# Patient Record
Sex: MALE | Race: WHITE | Employment: FULL TIME | URBAN - METROPOLITAN AREA
[De-identification: names, ages, dates, MRNs, and addresses within clinical notes are randomized per-mention and may not be internally consistent; named-entity substitution may affect disease eponyms.]

---

## 2019-04-23 ENCOUNTER — PRE VISIT (OUTPATIENT)
Dept: DERMATOLOGY | Facility: CLINIC | Age: 59
End: 2019-04-23

## 2019-04-23 NOTE — TELEPHONE ENCOUNTER
**LOOK AT HEALTH MAINTENANCE**        Dermatology Pre-visit Call:    Reason for visit: Skin Check     Any personal history of skin cancers: No    Any family history of skin cancers: No    Was the patient referred: No    If the patient was referred, are records obtained: NA.     Has the patient seen a dermatologist in the past: No. Records obtained: NA    Patient Reminders Given:  --Please, make sure you bring an updated list of your medications, allergies and insurance information.  --Plan on being in our facility for approximately one hour, this includes the registration process, office visit, education and check-out process.  If you are having a procedure, more time may be required.     --We are located on the second floor of the building, check in desk D.   --If you need to cancel or reschedule, call [phone].  --We look forward to seeing you in Dermatology Clinic.     Tameka Neil, CMA

## 2019-04-30 ENCOUNTER — OFFICE VISIT (OUTPATIENT)
Dept: DERMATOLOGY | Facility: CLINIC | Age: 59
End: 2019-04-30
Payer: COMMERCIAL

## 2019-04-30 DIAGNOSIS — D22.9 MULTIPLE BENIGN NEVI: ICD-10-CM

## 2019-04-30 DIAGNOSIS — L81.4 SOLAR LENTIGO: ICD-10-CM

## 2019-04-30 DIAGNOSIS — D48.9 NEOPLASM OF UNCERTAIN BEHAVIOR: Primary | ICD-10-CM

## 2019-04-30 DIAGNOSIS — L82.1 SEBORRHEIC KERATOSIS: ICD-10-CM

## 2019-04-30 DIAGNOSIS — L57.8 SUN-DAMAGED SKIN: ICD-10-CM

## 2019-04-30 DIAGNOSIS — L91.8 SKIN TAG: ICD-10-CM

## 2019-04-30 PROBLEM — Z86.69: Status: ACTIVE | Noted: 2019-04-24

## 2019-04-30 PROCEDURE — 11102 TANGNTL BX SKIN SINGLE LES: CPT | Performed by: DERMATOLOGY

## 2019-04-30 PROCEDURE — 99203 OFFICE O/P NEW LOW 30 MIN: CPT | Mod: 25 | Performed by: DERMATOLOGY

## 2019-04-30 PROCEDURE — 88305 TISSUE EXAM BY PATHOLOGIST: CPT | Mod: TC | Performed by: DERMATOLOGY

## 2019-04-30 PROCEDURE — 11103 TANGNTL BX SKIN EA SEP/ADDL: CPT | Performed by: DERMATOLOGY

## 2019-04-30 RX ORDER — FLUTICASONE PROPIONATE 50 MCG
1 SPRAY, SUSPENSION (ML) NASAL DAILY
COMMUNITY

## 2019-04-30 SDOH — HEALTH STABILITY: MENTAL HEALTH: HOW OFTEN DO YOU HAVE A DRINK CONTAINING ALCOHOL?: NEVER

## 2019-04-30 NOTE — PATIENT INSTRUCTIONS
Preventive Care:    Colorectal Cancer Screening: During our visit today, we discussed that it is recommended you receive colorectal cancer screening. Please call or make an appointment with your primary care provider to discuss this. You may also call the  [facility] scheduling line ([phone]) to set up a colonoscopy appointment.      Wound Care After a Biopsy    What is a skin biopsy?  A skin biopsy allows the doctor to examine a very small piece of tissue under the microscope to determine the diagnosis and the best treatment for the skin condition. A local anesthetic (numbing medicine)  is injected with a very small needle into the skin area to be tested. A small piece of skin is taken from the area. Sometimes a suture (stitch) is used.     What are the risks of a skin biopsy?  I will experience scar, bleeding, swelling, pain, crusting and redness. I may experience incomplete removal or recurrence. Risks of this procedure are excessive bleeding, bruising, infection, nerve damage, numbness, thick (hypertrophic or keloidal) scar and non-diagnostic biopsy.    How should I care for my wound for the first 24 hours?    Keep the wound dry and covered for 24 hours    If it bleeds, hold direct pressure on the area for 15 minutes. If bleeding does not stop then go to the emergency room    Avoid strenuous exercise the first 1-2 days or as your doctor instructs you    How should I care for the wound after 24 hours?    After 24 hours, remove the bandage    You may bathe or shower as normal    If you had a scalp biopsy, you can shampoo as usual and can use shower water to clean the biopsy site daily    Clean the wound twice a day with gentle soap and water    Do not scrub, be gentle    Apply white petroleum/Vaseline after cleaning the wound with a cotton swab or a clean finger, and keep the site covered with a Bandaid /bandage. Bandages are not necessary with a scalp biopsy    If you are unable to cover the site with a  Bandaid /bandage, re-apply ointment 2-3 times a day to keep the site moist. Moisture will help with healing    Avoid strenuous activity for first 1-2 days    Avoid lakes, rivers, pools, and oceans until the stitches are removed or the site is healed    How do I clean my wound?    Wash hands thoroughly with soap or use hand  before all wound care    Clean the wound with gentle soap and water    Apply white petroleum/Vaseline  to wound after it is clean    Replace the Bandaid /bandage to keep the wound covered for the first few days or as instructed by your doctor    If you had a scalp biopsy, warm shower water to the area on a daily basis should suffice    What should I use to clean my wound?     Cotton-tipped applicators (Qtips )    White petroleum jelly (Vaseline ). Use a clean new container and use Q-tips to apply.    Bandaids   as needed    Gentle soap     How should I care for my wound long term?    Do not get your wound dirty    Keep up with wound care for one week or until the area is healed.    A small scab will form and fall off by itself when the area is completely healed. The area will be red and will become pink in color as it heals. Sun protection is very important for how your scar will turn out. Sunscreen with an SPF 30 or greater is recommended once the area is healed.    You should have some soreness but it should be mild and slowly go away over several days. Talk to your doctor about using tylenol for pain,    When should I call my doctor?  If you have increased:     Pain or swelling    Pus or drainage (clear or slightly yellow drainage is ok)    Temperature over 100F    Spreading redness or warmth around wound    When will I hear about my results?  The biopsy results can take 2-3 weeks to come back. The clinic will call you with the results, send you a Jybe message, or have you schedule a follow-up clinic or phone time to discuss the results. Contact our clinics if you do not hear from  us in 3 weeks.     Who should I call with questions?    [facility]: [phone]     [facility]: [phone]    For urgent needs outside of business hours call the [facility] at [phone] and ask for the dermatology resident on call

## 2019-04-30 NOTE — LETTER
4/30/2019         RE: Audie Harris  [id number] [facility] [address]        Dear Colleague,    Thank you for referring your patient, Audie Harris, to the [facility]. Please see a copy of my visit note below.    [facility] Dermatology Note      Dermatology Problem List:  1. A) NUB, right lateral neck, s/p biopsy 4/20/2019  2. B) NUB, left upper back lateral. 1.2 cm pink scaly plaque s/p biopsy 4/20/2019  3. C) NUB, left upper back medial. s/p biopsy 4/20/2019  4. D) NUB, left lower back. s/p biopsy 4/20/2019  5. Likely BCCs, not yet biopsies: On the central back, 3 pearly pink papules  - will biopsy at next visit    Encounter Date: Apr 30, 2019    CC:  Chief Complaint   Patient presents with     Skin Check     lesions on neck and back         History of Present Illness:  Mr. Audie Harris is a 59 year old male who presents in self referral for a skin check. Today, he has lesions of concern on his neck and back. The spots on his back have been there a while. Areas are not tender, but never seem to heal. They are getting larger over time slowly. He has never been to a dermatologist. He had a mole removed on his forehead about 30 years ago.  No other concerns addressed today.      Past Medical History:   Patient Active Problem List   Diagnosis     Benign prostatic hyperplasia with urinary obstruction     Central retinal vein occlusion of right eye     Chronic back pain     Erectile dysfunction     H/O visual field defect     Hyperlipidemia     Franco's syndrome (H)     Past Medical History:   Diagnosis Date     Benign prostate hyperplasia      Central retinal vein occlusion      Deviated septum     Septoplasty     Hyperlipidemia      Franco's disease (H)      Past Surgical History:   Procedure Laterality Date     C RADIAL KERATOTOMY       COLONOSCOPY  08/24/2009     DECOMPRESSION, FUSION LUMBAR POSTERIOR TWO LEVELS, COMBINED       HC REMOVAL  NAIL/NAIL BED, W/ AMPUTATION TUFT OF PHALANX       HC REPAIR SLIDING INGUINAL HERNIA Bilateral      LAMINECTOMY, FUSION LUMBAR ONE LEVEL, COMBINED       PROSTATE SURGERY       SEPTOPLASTY       TURP         Social History:  Patient reports that he has never smoked. He has never used smokeless tobacco. He reports that he does not drink alcohol. Patient .     Family History:  No family history of skin cancer.     Medications:  Current Outpatient Medications   Medication Sig Dispense Refill     fluticasone (FLONASE) 50 MCG/ACT nasal spray Spray 1 spray into both nostrils daily         No Known Allergies    Review of Systems:  -Constitutional: Patient is otherwise feeling well, in usual state of health.   -Skin: As above in HPI. No additional skin concerns.    Physical exam:  Vitals: There were no vitals taken for this visit.  GEN: This is a well developed, well-nourished male in no acute distress, in a pleasant mood.    SKIN: Total skin excluding the undergarment areas was performed. The exam included the head/face, neck, both arms, chest, back, abdomen, both legs, digits and/or nails and buttocks.   - Escamilla Type II  - Scattered brown macules on sun exposed areas, mostly on shoulders and upper back.  - right lateral neck, hyperkeratotic 1.4 cm pink scaly plaque   - 2 similar pink scaly plaques on the left upper back, lateral one 1.2 cm in size the medial one is 6 mm in size  - 3 pink pearly papules on the central upper back   - skin tag left axilla   - 1.4 cm pink scaly and ulcerated plaque on the left lower back  - Multiple regular brown pigmented macules and papules are identified on the trunk.   - There are waxy stuck on tan to brown papules on the trunk.  - No other lesions of concern on areas examined.                         Impression/Plan:  1. Sun damaged skin with solar lentigines    Recommend sunscreens SPF #30 or greater, protective clothing and avoidance of tanning beds.    2. Benign  findings including: seborrheic keratoses, skin tags    No further intervention required. Patient to report changes.     Patient reassured of the benign nature of these lesions.    3. Multiple clinically benign nevi    No further intervention required. Patient to report changes.     Patient reassured of the benign nature of these lesions.    4. A) NUB, right lateral neck. hyperkeratotic 1.4 cm pink scaly plaque. Ddx: superficial BCC vs AK vs SCC     Shave biopsy:  After discussion of benefits and risks including but not limited to bleeding/bruising, pain/swelling, infection, scar, incomplete removal, nerve damage/numbness, recurrence, and non-diagnostic biopsy, written consent, verbal consent and photographs were obtained. Time-out was performed. The area was cleaned with isopropyl alcohol. 0.5ml of 1% lidocaine with 1:100,000 epinephrine was injected to obtain adequate anesthesia. A shave biopsy was performed. Hemostasis was achieved with aluminium chloride. Vaseline and a sterile dressing were applied. The patient tolerated the procedure and no complications were noted. The patient was provided with verbal and written post care instructions.    5. B) NUB, left upper back lateral. 1.2 cm pink scaly plaque. Ddx: superficial BCC vs AK vs SCC    Shave biopsy:  After discussion of benefits and risks including but not limited to bleeding/bruising, pain/swelling, infection, scar, incomplete removal, nerve damage/numbness, recurrence, and non-diagnostic biopsy, written consent, verbal consent and photographs were obtained. Time-out was performed. The area was cleaned with isopropyl alcohol. 0.5ml of 1% lidocaine with 1:100,000 epinephrine was injected to obtain adequate anesthesia. A shave biopsy was performed. Hemostasis was achieved with aluminium chloride. Vaseline and a sterile dressing were applied. The patient tolerated the procedure and no complications were noted. The patient was provided with verbal and written  post care instructions.    6. C) NUB, left upper back medial. 6 mm pink scaly plaque. Ddx: superficial BCC vs AK vs SCC    Shave biopsy:  After discussion of benefits and risks including but not limited to bleeding/bruising, pain/swelling, infection, scar, incomplete removal, nerve damage/numbness, recurrence, and non-diagnostic biopsy, written consent, verbal consent and photographs were obtained. Time-out was performed. The area was cleaned with isopropyl alcohol. 0.5ml of 1% lidocaine with 1:100,000 epinephrine was injected to obtain adequate anesthesia. A shave biopsy was performed. Hemostasis was achieved with aluminium chloride. Vaseline and a sterile dressing were applied. The patient tolerated the procedure and no complications were noted. The patient was provided with verbal and written post care instructions.    7. D) NUB, left lower back. 1.4 cm pink scaly and ulcerated plaque. Ddx: superficial BCC vs AK vs SCC    Shave biopsy:  After discussion of benefits and risks including but not limited to bleeding/bruising, pain/swelling, infection, scar, incomplete removal, nerve damage/numbness, recurrence, and non-diagnostic biopsy, written consent, verbal consent and photographs were obtained. Time-out was performed. The area was cleaned with isopropyl alcohol. 0.5ml of 1% lidocaine with 1:100,000 epinephrine was injected to obtain adequate anesthesia. A shave biopsy was performed. Hemostasis was achieved with aluminium chloride. Vaseline and a sterile dressing were applied. The patient tolerated the procedure and no complications were noted. The patient was provided with verbal and written post care instructions.    8. 3 pearly pink papules on the central back, likely BCCs. Patient will return for follow up biopsies after treatment of other suspected skin cancers as above. See photograph for circled lesions above.       Follow-up for further biopsy 3 months.       Staff Involved:  Scribe/Staff    Scribe  Disclosure  I, Elvia Peguero, am serving as a scribe to document services personally performed by Dr. Zayra Santos MD, based on data collection and the provider's statements to me.     Provider Disclosure:   The documentation recorded by the scribe accurately reflects the services I personally performed and the decisions made by me.    Zayra Santos MD    Department of Dermatology  [facility]: Phone: [phone], Fax:[phone]  [facility]: Phone: [phone], Fax: [phone]              Again, thank you for allowing me to participate in the care of your patient.        Sincerely,        Zayra Santos MD

## 2019-04-30 NOTE — NURSING NOTE
Audie Harris's goals for this visit include:   Chief Complaint   Patient presents with     Skin Check     lesions on neck and back       He requests these members of his care team be copied on today's visit information: NO    PCP: No Ref-Primary, Physician    Referring Provider:  No referring provider defined for this encounter.    There were no vitals taken for this visit.    Do you need any medication refills at today's visit? NO    Tameka Neil [facility]

## 2019-04-30 NOTE — PROGRESS NOTES
[facility] Dermatology Note      Dermatology Problem List:  1. A) NUB, right lateral neck, s/p biopsy 4/20/2019  2. B) NUB, left upper back lateral. 1.2 cm pink scaly plaque s/p biopsy 4/20/2019  3. C) NUB, left upper back medial. s/p biopsy 4/20/2019  4. D) NUB, left lower back. s/p biopsy 4/20/2019  5. Likely BCCs, not yet biopsies: On the central back, 3 pearly pink papules  - will biopsy at next visit    Encounter Date: Apr 30, 2019    CC:  Chief Complaint   Patient presents with     Skin Check     lesions on neck and back         History of Present Illness:  Mr. Audie Harris is a 59 year old male who presents in self referral for a skin check. Today, he has lesions of concern on his neck and back. The spots on his back have been there a while. Areas are not tender, but never seem to heal. They are getting larger over time slowly. He has never been to a dermatologist. He had a mole removed on his forehead about 30 years ago.  No other concerns addressed today.      Past Medical History:   Patient Active Problem List   Diagnosis     Benign prostatic hyperplasia with urinary obstruction     Central retinal vein occlusion of right eye     Chronic back pain     Erectile dysfunction     H/O visual field defect     Hyperlipidemia     Franco's syndrome (H)     Past Medical History:   Diagnosis Date     Benign prostate hyperplasia      Central retinal vein occlusion      Deviated septum     Septoplasty     Hyperlipidemia      Franco's disease (H)      Past Surgical History:   Procedure Laterality Date     C RADIAL KERATOTOMY       COLONOSCOPY  08/24/2009     DECOMPRESSION, FUSION LUMBAR POSTERIOR TWO LEVELS, COMBINED       HC REMOVAL NAIL/NAIL BED, W/ AMPUTATION TUFT OF PHALANX       HC REPAIR SLIDING INGUINAL HERNIA Bilateral      LAMINECTOMY, FUSION LUMBAR ONE LEVEL, COMBINED       PROSTATE SURGERY       SEPTOPLASTY       TURP         Social History:  Patient reports that he has never  smoked. He has never used smokeless tobacco. He reports that he does not drink alcohol. Patient .     Family History:  No family history of skin cancer.     Medications:  Current Outpatient Medications   Medication Sig Dispense Refill     fluticasone (FLONASE) 50 MCG/ACT nasal spray Spray 1 spray into both nostrils daily         No Known Allergies    Review of Systems:  -Constitutional: Patient is otherwise feeling well, in usual state of health.   -Skin: As above in HPI. No additional skin concerns.    Physical exam:  Vitals: There were no vitals taken for this visit.  GEN: This is a well developed, well-nourished male in no acute distress, in a pleasant mood.    SKIN: Total skin excluding the undergarment areas was performed. The exam included the head/face, neck, both arms, chest, back, abdomen, both legs, digits and/or nails and buttocks.   - Escamilla Type II  - Scattered brown macules on sun exposed areas, mostly on shoulders and upper back.  - right lateral neck, hyperkeratotic 1.4 cm pink scaly plaque   - 2 similar pink scaly plaques on the left upper back, lateral one 1.2 cm in size the medial one is 6 mm in size  - 3 pink pearly papules on the central upper back   - skin tag left axilla   - 1.4 cm pink scaly and ulcerated plaque on the left lower back  - Multiple regular brown pigmented macules and papules are identified on the trunk.   - There are waxy stuck on tan to brown papules on the trunk.  - No other lesions of concern on areas examined.                         Impression/Plan:  1. Sun damaged skin with solar lentigines    Recommend sunscreens SPF #30 or greater, protective clothing and avoidance of tanning beds.    2. Benign findings including: seborrheic keratoses, skin tags    No further intervention required. Patient to report changes.     Patient reassured of the benign nature of these lesions.    3. Multiple clinically benign nevi    No further intervention required.  Patient to report changes.     Patient reassured of the benign nature of these lesions.    4. A) NUB, right lateral neck. hyperkeratotic 1.4 cm pink scaly plaque. Ddx: superficial BCC vs AK vs SCC     Shave biopsy:  After discussion of benefits and risks including but not limited to bleeding/bruising, pain/swelling, infection, scar, incomplete removal, nerve damage/numbness, recurrence, and non-diagnostic biopsy, written consent, verbal consent and photographs were obtained. Time-out was performed. The area was cleaned with isopropyl alcohol. 0.5ml of 1% lidocaine with 1:100,000 epinephrine was injected to obtain adequate anesthesia. A shave biopsy was performed. Hemostasis was achieved with aluminium chloride. Vaseline and a sterile dressing were applied. The patient tolerated the procedure and no complications were noted. The patient was provided with verbal and written post care instructions.    5. B) NUB, left upper back lateral. 1.2 cm pink scaly plaque. Ddx: superficial BCC vs AK vs SCC    Shave biopsy:  After discussion of benefits and risks including but not limited to bleeding/bruising, pain/swelling, infection, scar, incomplete removal, nerve damage/numbness, recurrence, and non-diagnostic biopsy, written consent, verbal consent and photographs were obtained. Time-out was performed. The area was cleaned with isopropyl alcohol. 0.5ml of 1% lidocaine with 1:100,000 epinephrine was injected to obtain adequate anesthesia. A shave biopsy was performed. Hemostasis was achieved with aluminium chloride. Vaseline and a sterile dressing were applied. The patient tolerated the procedure and no complications were noted. The patient was provided with verbal and written post care instructions.    6. C) NUB, left upper back medial. 6 mm pink scaly plaque. Ddx: superficial BCC vs AK vs SCC    Shave biopsy:  After discussion of benefits and risks including but not limited to bleeding/bruising, pain/swelling, infection,  scar, incomplete removal, nerve damage/numbness, recurrence, and non-diagnostic biopsy, written consent, verbal consent and photographs were obtained. Time-out was performed. The area was cleaned with isopropyl alcohol. 0.5ml of 1% lidocaine with 1:100,000 epinephrine was injected to obtain adequate anesthesia. A shave biopsy was performed. Hemostasis was achieved with aluminium chloride. Vaseline and a sterile dressing were applied. The patient tolerated the procedure and no complications were noted. The patient was provided with verbal and written post care instructions.    7. D) NUB, left lower back. 1.4 cm pink scaly and ulcerated plaque. Ddx: superficial BCC vs AK vs SCC    Shave biopsy:  After discussion of benefits and risks including but not limited to bleeding/bruising, pain/swelling, infection, scar, incomplete removal, nerve damage/numbness, recurrence, and non-diagnostic biopsy, written consent, verbal consent and photographs were obtained. Time-out was performed. The area was cleaned with isopropyl alcohol. 0.5ml of 1% lidocaine with 1:100,000 epinephrine was injected to obtain adequate anesthesia. A shave biopsy was performed. Hemostasis was achieved with aluminium chloride. Vaseline and a sterile dressing were applied. The patient tolerated the procedure and no complications were noted. The patient was provided with verbal and written post care instructions.    8. 3 pearly pink papules on the central back, likely BCCs. Patient will return for follow up biopsies after treatment of other suspected skin cancers as above. See photograph for circled lesions above.       Follow-up for further biopsy 3 months.       Staff Involved:  Scribe/Staff    Scribe Disclosure  I, Elvia Peguero, am serving as a scribe to document services personally performed by Dr. Zayra Santos MD, based on data collection and the provider's statements to me.     Provider Disclosure:   The documentation recorded by the scribe  accurately reflects the services I personally performed and the decisions made by me.    Zayra Santos MD    Department of Dermatology  [facility]: Phone: [phone], Fax:[phone]  [facility]: Phone: [phone], Fax: [phone]

## 2019-05-03 LAB — COPATH REPORT: NORMAL

## 2019-05-14 ENCOUNTER — TELEPHONE (OUTPATIENT)
Dept: DERMATOLOGY | Facility: CLINIC | Age: 59
End: 2019-05-14

## 2019-05-14 NOTE — TELEPHONE ENCOUNTER
[facility] Call Center    Phone Message    May a detailed message be left on voicemail: yes    Reason for Call: Requesting Results   Name/type of test: Dr. Santos  Date of test: 04/30/19  Was test done at a location other than [facility] (Please fill in the location if not [facility])?: No  Prefers to be called at 3pm if possible not able to answer calls before that time. Ok to LVM    Action Taken: Message routed to:  Adult Clinics: Dermatology p [id number]

## 2019-05-14 NOTE — TELEPHONE ENCOUNTER
I left a message for patient to call [facility].  Patient notified on voicemail of results and that we are awaiting Dr. Carr's recommendation for treatment options.  Call back # provided.  Sarah Daniels RN

## 2019-05-15 NOTE — TELEPHONE ENCOUNTER
I left a message for patient to call [facility].  See result note.  Sarah Daniels RN

## 2019-05-22 ENCOUNTER — TELEPHONE (OUTPATIENT)
Dept: DERMATOLOGY | Facility: CLINIC | Age: 59
End: 2019-05-22

## 2019-05-22 NOTE — TELEPHONE ENCOUNTER
Excision previsit information                                                    Audie Harris is a 59 year old male     Patient is being referred to Dr. Carr  Referring Provider: Dr. Santos  For treatment of: basal cell carcinoma  Site(s): Right lateral neck, left upper back lateral, left upper back medial, left lower back.   -if site is the groin or below knee, send to RN for initiation of antibiotic prophylaxis protocol.  Previous Records received:  YES    Medication & Allergy Information                                                    CURRENT MEDICATIONS:   Current Outpatient Medications   Medication Sig Dispense Refill     fluticasone (FLONASE) 50 MCG/ACT nasal spray Spray 1 spray into both nostrils daily         Do you take the following medications:  Aspirin:  NO  Ibuprofen/Advil/Motrin, Aleve/Naproxen:  No  Coumadin, Eliquis, Pradaxa, Xarelto:   NO   -If on Coumadin, INR should be checked within 7 days of surgery.  Range should be 3.5 or less or within therapeutic range.  Vitamin E:   NO  Plavix:   NO    [place]'s wart: NO   Garlic supplement:  NO   Fish Oil: NO  Antibiotic Prophylaxis:  NO    Do you have an ALLERGIC REACTION to any medications:  NO  Patient has no known allergies.    Past Medical History                                                    Do you currently or have you previously had any of the following conditions:       HIV/AIDS:  NO    Hepatitis:  NO    Suppressed Immune System:  NO    Autoimmune disorder (eg RA, Lupus):  NO    Prolonged bleeding or bleeding disorder:  NO    Fever blisters/herpes, cold sores:  NO    Kidney disease:  NO  No results found for: CR]    Pacemaker or Defibrillator:  NO.      History of artificial or heart valve replacement:  NO.      Endocarditis: NO    Organ transplant: NO.    Joint replacement within past 2 years: NO    Previous prosthetic joint infection: NO    Diabetes: NO    Pregnant:: NO    Keloids or Abnormal scars: NO    Mobility device  (wheelchair, transfer difficulty): NO    Tobacco use:  NO.    History   Smoking Status     Never Smoker   Smokeless Tobacco     Never Used       If any positives, send to RN to initiate antibiotic prophylaxis protocol and/or anticoagulation management protocol    Reviewed by:  Sonal Alejandre

## 2019-05-30 ENCOUNTER — OFFICE VISIT (OUTPATIENT)
Dept: DERMATOLOGY | Facility: CLINIC | Age: 59
End: 2019-05-30
Payer: COMMERCIAL

## 2019-05-30 VITALS — SYSTOLIC BLOOD PRESSURE: 136 MMHG | HEART RATE: 73 BPM | DIASTOLIC BLOOD PRESSURE: 99 MMHG

## 2019-05-30 DIAGNOSIS — C44.519 BASAL CELL CARCINOMA (BCC) OF UPPER BACK: ICD-10-CM

## 2019-05-30 DIAGNOSIS — C44.41 BASAL CELL CARCINOMA (BCC) OF RIGHT SIDE OF NECK: Primary | ICD-10-CM

## 2019-05-30 DIAGNOSIS — C44.519 BASAL CELL CARCINOMA OF LOWER BACK: ICD-10-CM

## 2019-05-30 PROCEDURE — 11623 EXC S/N/H/F/G MAL+MRG 2.1-3: CPT | Mod: 51 | Performed by: DERMATOLOGY

## 2019-05-30 PROCEDURE — 12042 INTMD RPR N-HF/GENIT2.6-7.5: CPT | Mod: 51 | Performed by: DERMATOLOGY

## 2019-05-30 PROCEDURE — 12035 INTMD RPR S/A/T/EXT 12.6-20: CPT | Performed by: DERMATOLOGY

## 2019-05-30 PROCEDURE — 11602 EXC TR-EXT MAL+MARG 1.1-2 CM: CPT | Mod: 51 | Performed by: DERMATOLOGY

## 2019-05-30 PROCEDURE — 11603 EXC TR-EXT MAL+MARG 2.1-3 CM: CPT | Mod: 51 | Performed by: DERMATOLOGY

## 2019-05-30 PROCEDURE — 88305 TISSUE EXAM BY PATHOLOGIST: CPT | Mod: TC | Performed by: DERMATOLOGY

## 2019-05-30 RX ORDER — MUPIROCIN 20 MG/G
OINTMENT TOPICAL
Qty: 22 G | Refills: 0 | Status: SHIPPED | OUTPATIENT
Start: 2019-05-30 | End: 2020-06-25

## 2019-05-30 ASSESSMENT — PAIN SCALES - GENERAL: PAINLEVEL: NO PAIN (0)

## 2019-05-30 NOTE — PATIENT INSTRUCTIONS

## 2019-05-30 NOTE — NURSING NOTE
The following medication was given:     MEDICATION:  Lidocaine with epinephrine 1% [id number]  ROUTE: SQ  SITE: see procedure note  DOSE: 31cc  LOT #: [id number]  : Hospira  EXPIRATION DATE: 1-feb-2020  NDC#: 6338-1410-36   Was there drug waste? 2cc  Multi-dose vial: Yes    Purvi Montaño LPN  May 30, 2019    Prior to injection, verified patient identity using patient's name and date of birth.      Lido with epi 1%    Drug Amount Wasted:  Yes: 2 mg/ml   Vial/Syringe: Multi dose vial  Expiration Date:  1-feb-2020    Purvi Montaño LPN

## 2019-05-30 NOTE — PROGRESS NOTES
DERMATOLOGY EXCISION PROCEDURE NOTE    [facility]   [address], [place] [address]     NAME OF PROCEDURE: Excision intermediate layered linear closure  Staff surgeon: John Carr DO  Scrub Nurse: Purvi Montaño LPN     PRE-OPERATIVE DIAGNOSIS:  Basal cell carcinoma  POST-OPERATIVE DIAGNOSIS: same   FINAL EXCISION SIZE(DEFECT SIZE): 2.8 x 2.1 cm, with 4 mm margin   FINAL REPAIR LENGTH: 6.7 cm     INDICATIONS: This patient presented with a 2.0 x 1.3 cm basal cell carcinoma of the left lower back. Excision was indicated. We discussed the principles of treatment and most likely complications including scarring, bleeding, infection, incomplete excision, wound dehiscence, pain, nerve damage, and recurrence. Informed consent was obtained and the patient underwent the procedure as follows:    PROCEDURE: The patient was taken to the operative suite. Time-out was performed.  The treatment area was anesthetized with 1% lidocaine and epinephrine (1:100,000). The area was prepped with Chlorhexidine and rinsed with sterile saline and draped with sterile towels. The lesion was delineated and excised down to subcutaneous fat in a elliptical manner. Hemostasis was obtained by electrocoagulation.     REPAIR: An intermediate layered linear closure was selected as the procedure which would maximally preserve both function and cosmesis.    After the excision of the tumor, the area was carefully undermined. Hemostasis was obtained with electrocoagulation.  Closure was oriented so that the wound was in the patient's natural skin tension lines. The subcutaneous and dermal layers were then closed with 3-0, 4-0 Vicryl sutures. The epidermis was then carefully approximated along the length of the wound using 4-0 Prolene simple running sutures.     The final wound length was 6.7 cm. A total of 5.0 ml of anesthesia was administered for all surgical sites. Estimated blood loss was less than  10 ml for all surgical sites. A sterile pressure dressing was applied and wound care instructions, with a written handout, were given. The [facility] and ambulatory.    Follow-up in 10-14 days for suture removal, otherwise in 2 months for skin exam.       Anatomic Pathology Results: pending      -------------------------------------------------------------------------------------------------------------      DERMATOLOGY EXCISION PROCEDURE NOTE    [facility]   [address], [place] [address]     NAME OF PROCEDURE: Excision intermediate layered linear closure  Staff surgeon: John Carr DO  Scrub Nurse: Purvi Montaño LPN     PRE-OPERATIVE DIAGNOSIS:  Basal cell carcinoma   POST-OPERATIVE DIAGNOSIS: same   FINAL EXCISION SIZE(DEFECT SIZE): 2.3 x 1.8 cm and 1.4 x 1.6 cm, with 4 mm margin   FINAL REPAIR LENGTH: 7.7 cm     INDICATIONS: This patient presented with a 1.5 x 1.0 cm and 0.6 x 0.8 cm basal cell carcinomas of the left upper back medial and lateral (repaired together). Excision was indicated. We discussed the principles of treatment and most likely complications including scarring, bleeding, infection, incomplete excision, wound dehiscence, pain, nerve damage, and recurrence. Informed consent was obtained and the patient underwent the procedure as follows:    PROCEDURE: The patient was taken to the operative suite. Time-out was performed.  The treatment area was anesthetized with 1% lidocaine and epinephrine (1:100,000). The area was prepped with Chlorhexidine and rinsed with sterile saline and draped with sterile towels. The lesion was delineated and excised down to subcutaneous fat in a elliptical manner. Hemostasis was obtained by electrocoagulation.     REPAIR: An intermediate layered linear closure was selected as the procedure which would maximally preserve both function and cosmesis.    After the  excision of the tumor, the area was carefully undermined. Hemostasis was obtained with electrocoagulation.  Closure was oriented so that the wound was in the patient's natural skin tension lines. The subcutaneous and dermal layers were then closed with 3-0, 4-0 Vicryl sutures. The epidermis was then carefully approximated along the length of the wound using 4-0 Prolene simple running sutures.     The final wound length was 7.7 cm. A total of 7.0 ml of anesthesia was administered for all surgical sites. Estimated blood loss was less than 10 ml for all surgical sites. A sterile pressure dressing was applied and wound care instructions, with a written handout, were given. The patient was discharged [facility] the [facility] alert and ambulatory.    Follow-up in 10-14 days for suture removal, otherwise in 2 months for skin exam.       Anatomic Pathology Results: pending      -------------------------------------------------------------------------------------------------------      DERMATOLOGY EXCISION PROCEDURE NOTE    [facility]   [address] [place] [address]     NAME OF PROCEDURE: Excision intermediate layered linear closure  Staff surgeon: John Carr DO  Scrub Nurse: Purvi Montaño LPN     PRE-OPERATIVE DIAGNOSIS:  Basal cell carcinoma  POST-OPERATIVE DIAGNOSIS: same   FINAL EXCISION SIZE(DEFECT SIZE): 2.3 x 1.6 cm, with 4 mm margin   FINAL REPAIR LENGTH: 5.3 cm     INDICATIONS: This patient presented with a 1.5 x 0.8 cm basal cell carcinoma of the right lateral neck. Excision was indicated. We discussed the principles of treatment and most likely complications including scarring, bleeding, infection, incomplete excision, wound dehiscence, pain, nerve damage, and recurrence. Informed consent was obtained and the patient underwent the procedure as follows:    PROCEDURE: The patient was taken to the operative suite. Time-out was performed.  The  treatment area was anesthetized with 1% lidocaine and epinephrine (1:100,000). The area was prepped with Chlorhexidine and rinsed with sterile saline and draped with sterile towels. The lesion was delineated and excised down to subcutaneous fat in a elliptical manner. Hemostasis was obtained by electrocoagulation.     REPAIR: An intermediate layered linear closure was selected as the procedure which would maximally preserve both function and cosmesis.    After the excision of the tumor, the area was carefully undermined. Hemostasis was obtained with electrocoagulation.  Closure was oriented so that the wound was in the patient's natural skin tension lines. The subcutaneous and dermal layers were then closed with 4-0 Monocryl, 4-0 Vicryl sutures. The epidermis was then carefully approximated along the length of the wound using 4-0 Prolene simple running sutures.     The final wound length was 5.3 cm. A total of 6.0 ml of anesthesia was administered for all surgical sites. Estimated blood loss was less than 10 ml for all surgical sites. A sterile pressure dressing was applied and wound care instructions, with a written handout, were given. The patient was [facility] the [facility] alert and ambulatory.    Follow-up in 10-14 days for suture removal, otherwise in 2 months for skin exam.       Anatomic Pathology Results: pending      Staff Involved:    Scribe Disclosure  I, Fredis Vieira, am serving as a scribe to document services personally performed by Dr. John Carr DO, based on data collection and the provider's statements to me.     Provider Disclosure:   The documentation recorded by the scribe accurately reflects the services I personally performed and the decisions made by me.  I personally performed the procedures today.    John Carr DO    Department of Dermatology  [facility]: Phone: [phone],  Fax:[phone]  [facility]: Phone: [phone], Fax: [phone]

## 2019-05-30 NOTE — NURSING NOTE
Excision previsit information                                                    Audie Harris is a 59 year old male      Patient is being referred to Dr. Carr  Referring Provider: Dr. Santos  For treatment of: basal cell carcinoma  Site(s): Right lateral neck, left upper back lateral, left upper back medial, left lower back.              -if site is the groin or below knee, send to RN for initiation of antibiotic prophylaxis protocol.  Previous Records received:  YES     Medication & Allergy Information                                                    CURRENT MEDICATIONS:   Current Outpatient Prescriptions          Current Outpatient Medications   Medication Sig Dispense Refill     fluticasone (FLONASE) 50 MCG/ACT nasal spray Spray 1 spray into both nostrils daily                Do you take the following medications:  Aspirin:  NO  Ibuprofen/Advil/Motrin, Aleve/Naproxen:  No  Coumadin, Eliquis, Pradaxa, Xarelto:   NO              -If on Coumadin, INR should be checked within 7 days of surgery.  Range should be 3.5 or less or within therapeutic range.  Vitamin E:   NO  Plavix:   NO       [place]'s wart: NO     Garlic supplement:  NO          Fish Oil: NO  Antibiotic Prophylaxis:  NO     Do you have an ALLERGIC REACTION to any medications:  NO  Patient has no known allergies.     Past Medical History                                                    Do you currently or have you previously had any of the following conditions:        HIV/AIDS:  NO    Hepatitis:  NO    Suppressed Immune System:  NO    Autoimmune disorder (eg RA, Lupus):  NO    Prolonged bleeding or bleeding disorder:  NO    Fever blisters/herpes, cold sores:  NO    Kidney disease:  NO  No results found for: CR]    Pacemaker or Defibrillator:  NO.      History of artificial or heart valve replacement:  NO.      Endocarditis: NO    Organ transplant: NO.    Joint replacement within past 2 years: NO    Previous prosthetic joint infection:  NO    Diabetes: NO    Pregnant:: NO    Keloids or Abnormal scars: NO    Mobility device (wheelchair, transfer difficulty): NO    Tobacco use:  NO.        History   Smoking Status     Never Smoker   Smokeless Tobacco     Never Used         If any positives, send to RN to initiate antibiotic prophylaxis protocol and/or anticoagulation management protocol     Reviewed by:  Sonal Harris's goals for this visit include:   Chief Complaint   Patient presents with     Procedure     Excision x3        He requests these members of his care team be copied on today's visit information: yes    PCP: No Ref-Primary, Physician    Referring Provider:  No referring provider defined for this encounter.    There were no vitals taken for this visit.    Do you need any medication refills at today's visit? No     Marcos Medina CMA

## 2019-05-30 NOTE — NURSING NOTE
Mupirocin, Vaseline and pressure dressing applied to Excision site on right lateral neck left lower back and left upper back medial and lateral.  Wound care instructions reviewed with patient and AVS provided.  Patient verbalized understanding.  Patient will follow up for suture removal in two weeks.  No further questions or concerns at this time.      Purvi Montaño LPN

## 2019-05-30 NOTE — LETTER
5/30/2019         RE: Audie Harris  [address] [address] [address]        Dear Colleague,    Thank you for referring your patient, Audie Harris, to the [facility]. Please see a copy of my visit note below.    DERMATOLOGY EXCISION PROCEDURE NOTE    [facility]   [address], [place] [address]     NAME OF PROCEDURE: Excision intermediate layered linear closure  Staff surgeon: John Carr DO  Scrub Nurse: Purvi Montaño LPN     PRE-OPERATIVE DIAGNOSIS:  Basal cell carcinoma  POST-OPERATIVE DIAGNOSIS: same   FINAL EXCISION SIZE(DEFECT SIZE): 2.8 x 2.1 cm, with 4 mm margin   FINAL REPAIR LENGTH: 6.7 cm     INDICATIONS: This patient presented with a 2.0 x 1.3 cm basal cell carcinoma of the left lower back. Excision was indicated. We discussed the principles of treatment and most likely complications including scarring, bleeding, infection, incomplete excision, wound dehiscence, pain, nerve damage, and recurrence. Informed consent was obtained and the patient underwent the procedure as follows:    PROCEDURE: The patient was taken to the operative suite. Time-out was performed.  The treatment area was anesthetized with 1% lidocaine and epinephrine (1:100,000). The area was prepped with Chlorhexidine and rinsed with sterile saline and draped with sterile towels. The lesion was delineated and excised down to subcutaneous fat in a elliptical manner. Hemostasis was obtained by electrocoagulation.     REPAIR: An intermediate layered linear closure was selected as the procedure which would maximally preserve both function and cosmesis.    After the excision of the tumor, the area was carefully undermined. Hemostasis was obtained with electrocoagulation.  Closure was oriented so that the wound was in the patient's natural skin tension lines. The subcutaneous and dermal layers were then closed with 3-0, 4-0 Vicryl sutures. The  epidermis was then carefully approximated along the length of the wound using 4-0 Prolene simple running sutures.     The final wound length was 6.7 cm. A total of 5.0 ml of anesthesia was administered for all surgical sites. Estimated blood loss was less than 10 ml for all surgical sites. A sterile pressure dressing was applied and wound care instructions, with a written handout, were given. The patient was [facility] alert and ambulatory.    Follow-up in 10-14 days for suture removal, otherwise in 2 months for skin exam.       Anatomic Pathology Results: pending      -------------------------------------------------------------------------------------------------------------      DERMATOLOGY EXCISION PROCEDURE NOTE    [address] [address]     NAME OF PROCEDURE: Excision intermediate layered linear closure  Staff surgeon: John Carr DO  Scrub Nurse: Purvi Montaño LPN     PRE-OPERATIVE DIAGNOSIS:  Basal cell carcinoma   POST-OPERATIVE DIAGNOSIS: same   FINAL EXCISION SIZE(DEFECT SIZE): 2.3 x 1.8 cm and 1.4 x 1.6 cm, with 4 mm margin   FINAL REPAIR LENGTH: 7.7 cm     INDICATIONS: This patient presented with a 1.5 x 1.0 cm and 0.6 x 0.8 cm basal cell carcinomas of the left upper back medial and lateral (repaired together). Excision was indicated. We discussed the principles of treatment and most likely complications including scarring, bleeding, infection, incomplete excision, wound dehiscence, pain, nerve damage, and recurrence. Informed consent was obtained and the patient underwent the procedure as follows:    PROCEDURE: The patient was taken to the operative suite. Time-out was performed.  The treatment area was anesthetized with 1% lidocaine and epinephrine (1:100,000). The area was prepped with Chlorhexidine and rinsed with sterile saline and draped with sterile towels. The lesion was delineated  and excised down to subcutaneous fat in a elliptical manner. Hemostasis was obtained by electrocoagulation.     REPAIR: An intermediate layered linear closure was selected as the procedure which would maximally preserve both function and cosmesis.    After the excision of the tumor, the area was carefully undermined. Hemostasis was obtained with electrocoagulation.  Closure was oriented so that the wound was in the patient's natural skin tension lines. The subcutaneous and dermal layers were then closed with 3-0, 4-0 Vicryl sutures. The epidermis was then carefully approximated along the length of the wound using 4-0 Prolene simple running sutures.     The final wound length was 7.7 cm. A total of 7.0 ml of anesthesia was administered for all surgical sites. Estimated blood loss was less than 10 ml for all surgical sites. A sterile pressure dressing was applied and wound care instructions, with a written handout, were given. The patient was [facility] from the [facility] alert and ambulatory.    Follow-up in 10-14 days for suture removal, otherwise in 2 months for skin exam.       Anatomic Pathology Results: pending      -------------------------------------------------------------------------------------------------------      DERMATOLOGY EXCISION PROCEDURE NOTE    [facility]   [address], [place] [address]     NAME OF PROCEDURE: Excision intermediate layered linear closure  Staff surgeon: John Carr DO  Scrub Nurse: Purvi Montaño LPN     PRE-OPERATIVE DIAGNOSIS:  Basal cell carcinoma  POST-OPERATIVE DIAGNOSIS: same   FINAL EXCISION SIZE(DEFECT SIZE): 2.3 x 1.6 cm, with 4 mm margin   FINAL REPAIR LENGTH: 5.3 cm     INDICATIONS: This patient presented with a 1.5 x 0.8 cm basal cell carcinoma of the right lateral neck. Excision was indicated. We discussed the principles of treatment and most likely complications including scarring, bleeding,  infection, incomplete excision, wound dehiscence, pain, nerve damage, and recurrence. Informed consent was obtained and the patient underwent the procedure as follows:    PROCEDURE: The patient was taken to the operative suite. Time-out was performed.  The treatment area was anesthetized with 1% lidocaine and epinephrine (1:100,000). The area was prepped with Chlorhexidine and rinsed with sterile saline and draped with sterile towels. The lesion was delineated and excised down to subcutaneous fat in a elliptical manner. Hemostasis was obtained by electrocoagulation.     REPAIR: An intermediate layered linear closure was selected as the procedure which would maximally preserve both function and cosmesis.    After the excision of the tumor, the area was carefully undermined. Hemostasis was obtained with electrocoagulation.  Closure was oriented so that the wound was in the patient's natural skin tension lines. The subcutaneous and dermal layers were then closed with 4-0 Monocryl, 4-0 Vicryl sutures. The epidermis was then carefully approximated along the length of the wound using 4-0 Prolene simple running sutures.     The final wound length was 5.3 cm. A total of 6.0 ml of anesthesia was administered for all surgical sites. Estimated blood loss was less than 10 ml for all surgical sites. A sterile pressure dressing was applied and wound care instructions, with a written handout, were given. The patient was discharged from the [facility] alert and ambulatory.    Follow-up in 10-14 days for suture removal, otherwise in 2 months for skin exam.       Anatomic Pathology Results: pending      Staff Involved:    Scribe Disclosure  I, Fredis Vieira am serving as a scribe to document services personally performed by Dr. John Carr DO, based on data collection and the provider's statements to me.     Provider Disclosure:   The documentation recorded by the scribe accurately reflects the services I  personally performed and the decisions made by me.  I personally performed the procedures today.    John Carr DO    Department of Dermatology  [facility]: Phone: [phone], Fax:[phone]  [facility]: Phone: [phone], Fax: [phone]    Again, thank you for allowing me to participate in the care of your patient.        Sincerely,        John Carr MD

## 2019-05-31 ENCOUNTER — ALLIED HEALTH/NURSE VISIT (OUTPATIENT)
Dept: NURSING | Facility: CLINIC | Age: 59
End: 2019-05-31
Payer: COMMERCIAL

## 2019-05-31 DIAGNOSIS — Z51.89 VISIT FOR WOUND CHECK: Primary | ICD-10-CM

## 2019-05-31 PROCEDURE — 99207 ZZC NO CHARGE NURSE ONLY: CPT | Performed by: DERMATOLOGY

## 2019-05-31 NOTE — NURSING NOTE
Audie is 24 hour s/p excision x 3 on back.  He presented to clinic at request of RN due to concerns of bandage irritation on neck.    He states his wife lifted a corner of the bandage on his neck and noted the skin to be pink. Wounds were bandaged with cloth tape. He states it itches, but only mildly.  Patient denies pain at all sites.    I removed dressing on the neck and the surrounding tissue was pinkish red.  The incision site was intact with no erythema, edema, or drainage.  New Telfa and dental roll placed and covered with paper tape.    Site on mid back and lower back also assessed.  No pink or irritation noted to surrounding tissue.  Overlying cloth tape was replaced with paper tape.    Patient had no further questions or concerns.    Sarah Daniels RN

## 2019-06-03 LAB — COPATH REPORT: NORMAL

## 2019-06-13 ENCOUNTER — ALLIED HEALTH/NURSE VISIT (OUTPATIENT)
Dept: NURSING | Facility: CLINIC | Age: 59
End: 2019-06-13
Payer: COMMERCIAL

## 2019-06-13 DIAGNOSIS — Z48.02 VISIT FOR SUTURE REMOVAL: Primary | ICD-10-CM

## 2019-06-13 PROCEDURE — 99207 ZZC NO CHARGE NURSE ONLY: CPT | Performed by: DERMATOLOGY

## 2019-06-13 NOTE — NURSING NOTE
Audie Harris comes into clinic today at the request of   Ordering Provider for Suture removal .    Audie Harris presents to the clinic today for  removal of sutures.  The patient has had the sutures in place for 14 days.    There has been no history of infection or drainage.    O:  sutures are seen located on the right lateral neck, upper back and lower back .  The wound is healing well with no signs of infection.        A: Suture removal.    P:  All sutures were easily removed today.  Routine wound care discussed.  The patient will follow up as needed.        This service provided today was under the supervising provider of the day , who was available if needed.    Candace Vasquez RN

## 2019-07-30 ENCOUNTER — OFFICE VISIT (OUTPATIENT)
Dept: DERMATOLOGY | Facility: CLINIC | Age: 59
End: 2019-07-30
Payer: COMMERCIAL

## 2019-07-30 DIAGNOSIS — Z85.828 HISTORY OF NONMELANOMA SKIN CANCER: ICD-10-CM

## 2019-07-30 DIAGNOSIS — D48.9 NEOPLASM OF UNCERTAIN BEHAVIOR: Primary | ICD-10-CM

## 2019-07-30 PROCEDURE — 99213 OFFICE O/P EST LOW 20 MIN: CPT | Mod: 25 | Performed by: DERMATOLOGY

## 2019-07-30 PROCEDURE — 11103 TANGNTL BX SKIN EA SEP/ADDL: CPT | Performed by: DERMATOLOGY

## 2019-07-30 PROCEDURE — 11102 TANGNTL BX SKIN SINGLE LES: CPT | Performed by: DERMATOLOGY

## 2019-07-30 PROCEDURE — 88305 TISSUE EXAM BY PATHOLOGIST: CPT | Mod: TC | Performed by: DERMATOLOGY

## 2019-07-30 NOTE — NURSING NOTE
@Audie Harris's goals for this visit include:   Chief Complaint   Patient presents with     Biopsy       He requests these members of his care team be copied on today's visit information: NO    PCP: No Ref-Primary, Physician    Referring Provider:  No referring provider defined for this encounter.    There were no vitals taken for this visit.    Do you need any medication refills at today's visit? NO    Tameka Neil [facility]

## 2019-07-30 NOTE — NURSING NOTE
The following medication was given:     MEDICATION:  Lidocaine with epinephrine 1% 1:[id number]  ROUTE: SQ  SITE: see procedure note  DOSE: 1cc  LOT #: -[id number]  : [place]  EXPIRATION DATE: 12-1-19  NDC#: 6975-7161-62   Was there drug waste? Yes  Multi-dose vial: Yes    Tameka Neil CMA  July 30, 2019

## 2019-07-30 NOTE — LETTER
7/30/2019         RE: Audie Harris  [id number] [facility] [address]        Dear Colleague,    Thank you for referring your patient, Audie Harris, to the [facility]. Please see a copy of my visit note below.    [facility] Dermatology Note      Dermatology Problem List:   1.  History of BCC  - right lateral neck, s/p biopsy 4/20/2019, s/p excision 5/30/19  - left upper back lateral, s/p biopsy 4/20/2019, s/p excision 5/30/19  - left upper back medial, s/p biopsy 4/20/2019, s/p excision 5/30/19  - left lower back, s/p biopsy 4/20/2019, s/p excision 5/30/19  2. NUB A) left upper back, s/p biopsy 7/30/19  3. NUB B) central upper back, s/p biopsy 7/30/19  4. NUB C) left mid back, s/p biopsy 7/30/19    Encounter Date: Jul 30, 2019    CC:  Chief Complaint   Patient presents with     Biopsy         History of Present Illness:  Mr. Audie Harris is a 59 year old male who presents as a follow-up for biopsies. The patient was last seen for a clinic appt on 4/30/19  when four biopsies were done. All four showed BCC. He has since had excisions on all sites. He healed very easily. He prefers surgical removal of skin cancers as it is quick and done for him. At last visit, another 4 pearly papules were found on the left upper back and plan was for him to return for biopsies after treatment of initial four spots. He has not had any symptoms at these sites. Denies any tender, nonhealing, bleeding skin lesions elsewhere on the body. No other concerns addressed today.  .     Past Medical History:   Patient Active Problem List   Diagnosis     Benign prostatic hyperplasia with urinary obstruction     Central retinal vein occlusion of right eye     Chronic back pain     Erectile dysfunction     H/O visual field defect     Hyperlipidemia     Franco's syndrome (H)     Past Medical History:   Diagnosis Date     Benign prostate hyperplasia      Central retinal vein  occlusion      Deviated septum     Septoplasty     Hyperlipidemia      Franco's disease (H)      Past Surgical History:   Procedure Laterality Date     C RADIAL KERATOTOMY       COLONOSCOPY  08/24/2009     DECOMPRESSION, FUSION LUMBAR POSTERIOR TWO LEVELS, COMBINED       HC REMOVAL NAIL/NAIL BED, W/ AMPUTATION TUFT OF PHALANX       HC REPAIR SLIDING INGUINAL HERNIA Bilateral      LAMINECTOMY, FUSION LUMBAR ONE LEVEL, COMBINED       PROSTATE SURGERY       SEPTOPLASTY       TURP         Social History:  Patient reports that he has never smoked. He has never used smokeless tobacco. He reports that he does not drink alcohol. Works in [employer]. Spent a lot of time outdoor in his younger years playing [employer].     Family History:  Not addressed today.    Medications:  Current Outpatient Medications   Medication Sig Dispense Refill     fluticasone (FLONASE) 50 MCG/ACT nasal spray Spray 1 spray into both nostrils daily       mupirocin (BACTROBAN) 2 % external ointment Use 2 times a day to affected areas. 22 g 0       No Known Allergies    Review of Systems:  -Constitutional: Patient is otherwise feeling well, in usual state of health.   -Skin: As above in HPI. No additional skin concerns.    Physical exam:  Vitals: There were no vitals taken for this visit.  GEN: This is a well developed, well-nourished male in no acute distress, in a pleasant mood.    SKIN: Sun-exposed skin, which includes the head/face, neck, both arms, digits, and/or nails was examined.   - Escamilla Type II  - Well healed linear surgical scars in areas of past NMSC.   - Pink shiny pearly papule with arborizing vessels on the left upper back, central upper back, left mid back   - Resolution of lesions on left upper back marked in previous photo x2.   - No other lesions of concern on areas examined.                         Impression/Plan:    1. History of NMSC: No evidence of recurrence. New lesions of concern below. Will plan next skin check in 6  months.    2. A) Neoplasm of uncertain behavior on the left upper back. The differential diagnosis includes BCC vs other.     Shave biopsy:  After discussion of benefits and risks including but not limited to bleeding/bruising, pain/swelling, infection, scar, incomplete removal, nerve damage/numbness, recurrence, and non-diagnostic biopsy, written consent, verbal consent and photographs were obtained. Time-out was performed. The area was cleaned with isopropyl alcohol. 0.5ml of 1% lidocaine with 1:100,000 epinephrine was injected to obtain adequate anesthesia. A shave biopsy was performed. Hemostasis was achieved with aluminium chloride. Vaseline and a sterile dressing were applied. The patient tolerated the procedure and no complications were noted. The patient was provided with verbal and written post care instructions.    3. B) Neoplasm of uncertain behavior on the central upper back. The differential diagnosis includes BCC vs other.     Shave biopsy:  After discussion of benefits and risks including but not limited to bleeding/bruising, pain/swelling, infection, scar, incomplete removal, nerve damage/numbness, recurrence, and non-diagnostic biopsy, written consent, verbal consent and photographs were obtained. Time-out was performed. The area was cleaned with isopropyl alcohol. 0.5ml of 1% lidocaine with 1:100,000 epinephrine was injected to obtain adequate anesthesia. A shave biopsy was performed. Hemostasis was achieved with aluminium chloride. Vaseline and a sterile dressing were applied. The patient tolerated the procedure and no complications were noted. The patient was provided with verbal and written post care instructions.    4. C) Neoplasm of uncertain behavior on the left mid back. The differential diagnosis includes BCC vs other.     Shave biopsy:  After discussion of benefits and risks including but not limited to bleeding/bruising, pain/swelling, infection, scar, incomplete removal, nerve  damage/numbness, recurrence, and non-diagnostic biopsy, written consent, verbal consent and photographs were obtained. Time-out was performed. The area was cleaned with isopropyl alcohol. 0.5ml of 1% lidocaine with 1:100,000 epinephrine was injected to obtain adequate anesthesia. A shave biopsy was performed. Hemostasis was achieved with aluminium chloride. Vaseline and a sterile dressing were applied. The patient tolerated the procedure and no complications were noted. The patient was provided with verbal and written post care instructions.    Follow-up pending biopsy results, earlier for new or changing lesions.       Staff Involved:  Scribe/Staff    Scribe Disclosure  I, Gena Watson, am serving as a scribe to document services personally performed by Dr. Zayra Santos MD, based on data collection and the provider's statements to me.     Provider Disclosure:   The documentation recorded by the scribe accurately reflects the services I personally performed and the decisions made by me.    Zayra Santos MD    Department of Dermatology  [facility]: Phone: [phone], Fax:[phone]  [facility]: Phone: [phone], Fax: [phone]                Again, thank you for allowing me to participate in the care of your patient.        Sincerely,        Zayra Santos MD

## 2019-07-30 NOTE — PATIENT INSTRUCTIONS

## 2019-07-30 NOTE — PROGRESS NOTES
[facility] Dermatology Note      Dermatology Problem List:   1. History of BCC  - right lateral neck, s/p biopsy 4/20/2019, s/p excision 5/30/19  - left upper back lateral, s/p biopsy 4/20/2019, s/p excision 5/30/19  - left upper back medial, s/p biopsy 4/20/2019, s/p excision 5/30/19  - left lower back, s/p biopsy 4/20/2019, s/p excision 5/30/19  2. NUB A) left upper back, s/p biopsy 7/30/19  3. NUB B) central upper back, s/p biopsy 7/30/19  4. NUB C) left mid back, s/p biopsy 7/30/19    Encounter Date: Jul 30, 2019    CC:  Chief Complaint   Patient presents with     Biopsy         History of Present Illness:  Mr. Audie Harris is a 59 year old male who presents as a follow-up for biopsies. The patient was last seen for a clinic appt on 4/30/19  when four biopsies were done. All four showed BCC. He has since had excisions on all sites. He healed very easily. He prefers surgical removal of skin cancers as it is quick and done for him. At last visit, another 4 pearly papules were found on the left upper back and plan was for him to return for biopsies after treatment of initial four spots. He has not had any symptoms at these sites. Denies any tender, nonhealing, bleeding skin lesions elsewhere on the body. No other concerns addressed today.  .     Past Medical History:   Patient Active Problem List   Diagnosis     Benign prostatic hyperplasia with urinary obstruction     Central retinal vein occlusion of right eye     Chronic back pain     Erectile dysfunction     H/O visual field defect     Hyperlipidemia     Franco's syndrome (H)     Past Medical History:   Diagnosis Date     Benign prostate hyperplasia      Central retinal vein occlusion      Deviated septum     Septoplasty     Hyperlipidemia      Franco's disease (H)      Past Surgical History:   Procedure Laterality Date     C RADIAL KERATOTOMY       COLONOSCOPY  08/24/2009     DECOMPRESSION, FUSION LUMBAR POSTERIOR TWO LEVELS,  COMBINED       HC REMOVAL NAIL/NAIL BED, W/ AMPUTATION TUFT OF PHALANX       HC REPAIR SLIDING INGUINAL HERNIA Bilateral      LAMINECTOMY, FUSION LUMBAR ONE LEVEL, COMBINED       PROSTATE SURGERY       SEPTOPLASTY       TURP         Social History:  Patient reports that he has never smoked. He has never used smokeless tobacco. He reports that he does not drink alcohol. Works in [employer]. Spent a lot of time outdoor in his younger years playing [employer].     Family History:  Not addressed today.    Medications:  Current Outpatient Medications   Medication Sig Dispense Refill     fluticasone (FLONASE) 50 MCG/ACT nasal spray Spray 1 spray into both nostrils daily       mupirocin (BACTROBAN) 2 % external ointment Use 2 times a day to affected areas. 22 g 0       No Known Allergies    Review of Systems:  -Constitutional: Patient is otherwise feeling well, in usual state of health.   -Skin: As above in HPI. No additional skin concerns.    Physical exam:  Vitals: There were no vitals taken for this visit.  GEN: This is a well developed, well-nourished male in no acute distress, in a pleasant mood.    SKIN: Sun-exposed skin, which includes the head/face, neck, both arms, digits, and/or nails was examined.   - Escamilla Type II  - Well healed linear surgical scars in areas of past NMSC.   - Pink shiny pearly papule with arborizing vessels on the left upper back, central upper back, left mid back   - Resolution of lesions on left upper back marked in previous photo x2.   - No other lesions of concern on areas examined.                         Impression/Plan:    1. History of NMSC: No evidence of recurrence. New lesions of concern below. Will plan next skin check in 6 months.    2. A) Neoplasm of uncertain behavior on the left upper back. The differential diagnosis includes BCC vs other.     Shave biopsy:  After discussion of benefits and risks including but not limited to bleeding/bruising, pain/swelling, infection,  scar, incomplete removal, nerve damage/numbness, recurrence, and non-diagnostic biopsy, written consent, verbal consent and photographs were obtained. Time-out was performed. The area was cleaned with isopropyl alcohol. 0.5ml of 1% lidocaine with 1:100,000 epinephrine was injected to obtain adequate anesthesia. A shave biopsy was performed. Hemostasis was achieved with aluminium chloride. Vaseline and a sterile dressing were applied. The patient tolerated the procedure and no complications were noted. The patient was provided with verbal and written post care instructions.    3. B) Neoplasm of uncertain behavior on the central upper back. The differential diagnosis includes BCC vs other.     Shave biopsy:  After discussion of benefits and risks including but not limited to bleeding/bruising, pain/swelling, infection, scar, incomplete removal, nerve damage/numbness, recurrence, and non-diagnostic biopsy, written consent, verbal consent and photographs were obtained. Time-out was performed. The area was cleaned with isopropyl alcohol. 0.5ml of 1% lidocaine with 1:100,000 epinephrine was injected to obtain adequate anesthesia. A shave biopsy was performed. Hemostasis was achieved with aluminium chloride. Vaseline and a sterile dressing were applied. The patient tolerated the procedure and no complications were noted. The patient was provided with verbal and written post care instructions.    4. C) Neoplasm of uncertain behavior on the left mid back. The differential diagnosis includes BCC vs other.     Shave biopsy:  After discussion of benefits and risks including but not limited to bleeding/bruising, pain/swelling, infection, scar, incomplete removal, nerve damage/numbness, recurrence, and non-diagnostic biopsy, written consent, verbal consent and photographs were obtained. Time-out was performed. The area was cleaned with isopropyl alcohol. 0.5ml of 1% lidocaine with 1:100,000 epinephrine was injected to obtain  adequate anesthesia. A shave biopsy was performed. Hemostasis was achieved with aluminium chloride. Vaseline and a sterile dressing were applied. The patient tolerated the procedure and no complications were noted. The patient was provided with verbal and written post care instructions.    Follow-up pending biopsy results, earlier for new or changing lesions.       Staff Involved:  Scribe/Staff    Scribe Disclosure  IGena, am serving as a scribe to document services personally performed by Dr. Zayra Santos MD, based on data collection and the provider's statements to me.     Provider Disclosure:   The documentation recorded by the scribe accurately reflects the services I personally performed and the decisions made by me.    Zayra Santos MD    Department of Dermatology  [facility]: Phone: [phone], Fax:[phone]  [facility]: Phone: [phone], Fax: [phone]

## 2019-08-02 ENCOUNTER — TELEPHONE (OUTPATIENT)
Dept: DERMATOLOGY | Facility: CLINIC | Age: 59
End: 2019-08-02

## 2019-08-02 LAB — COPATH REPORT: NORMAL

## 2019-08-02 NOTE — TELEPHONE ENCOUNTER
Notes recorded by Purvi Montaño LPN on 8/2/2019 at 4:36 PM CDT  Called and informed patient of results and treatment recommendations. Procedures and aftercare reviewed with patient. Patient verbalized understanding and had no further questions or concerns. Patient scheduled for treatment of all three sites on August 29th at 2pm  With Dr. Carr.    Purvi Montaño LPN    ------    Notes recorded by Zayra Oquendo MD on 8/2/2019 at 3:19 PM CDT  Please let patient know all three spots showed BCCs. I recommend excision for A + B, and ED&C for site C. Please help schedule with Dr. Carr.    Next skin check with me in 3-6 months.    Zayra Oquendo MD    Department of Dermatology  [facility]: Phone: [phone], Fax:[phone]  [facility]: Phone: [phone], Fax: [phone]       Dermatological path order and indications   Order: [id number]   Status:  Preliminary result   Visible to patient:  No (Not Released) Dx:  Neoplasm of uncertain behavior   Component 3d ago   Copath Report Patient Name: ANIRUDH BREWER   MR#: [id number]   Specimen #: [id number]   Collected: 7/30/2019   Received: 7/31/2019   Reported: 8/2/2019 14:27   Ordering Phy(s): ZAYRA OQUENDO     For improved result formatting, select 'View Enhanced Report Format' under    Linked Documents section.     SPECIMEN(S):   A: Shave, left upper back   B: Shave, central upper back   C: Shave, left mid back     FINAL DIAGNOSIS:   A. Shave, left upper back:   - Basal cell carcinoma, nodular type, extending to the lateral margin -   (see description)     B. Shave, central upper back:   - Basal cell carcinoma, superficial and nodular types, extending to the   lateral and deep margins - (see   description)     C. Shave, left mid back:   - Basal cell carcinoma, superficial type, extending to the lateral margin   - (see  description)            Purvi Montaño LPN

## 2019-08-02 NOTE — TELEPHONE ENCOUNTER
Excision Intake                                                    There were no vitals taken for this visit.    Do you take the following medications:  Coumadin, Eliquis, Pradaxa, Xarelto:   NO  -If on Coumadin, INR should be checked within 7 days of surgery.  Range is 3.5 or less or within therapeutic range.  Antibiotic Prophylaxis:  NO    Do you have an iodine allergy:  NO    Past Medical History                                                    Do you currently or have you previously had any of the following conditions:       HIV/AIDS:  NO    Hepatitis:  NO    Suppressed Immune System:  NO    Autoimmune disorder (eg RA, Lupus):  NO    Fever blisters/herpes, cold sores:  NO    Kidney disease:  NO  No results found for: CR]    Pacemaker or Defibrillator:  NO.      History of artificial or heart valve replacement:  NO.      Endocarditis: NO    Organ transplant: NO.      Joint replacement within past 2 years: N/A    Previous prosthetic joint infection: NO    Diabetes: NO    Pregnant:: N/A    Tobacco use:  NO.    History   Smoking Status     Never Smoker   Smokeless Tobacco     Never Used     Reviewed by:  Purvi Montaño LPN

## 2019-08-02 NOTE — TELEPHONE ENCOUNTER
----- Message from Zayra Santos MD sent at 8/2/2019  3:19 PM CDT -----  Please let patient know all three spots showed BCCs. I recommend excision for A + B, and ED&C for site C. Please help schedule with Dr. Carr.    Next skin check with me in 3-6 months.    Zayra Santos MD    Department of Dermatology  [facility]: Phone: [phone], Fax:[phone]  [facility]: Phone: [phone], Fax: [phone]

## 2019-08-29 ENCOUNTER — OFFICE VISIT (OUTPATIENT)
Dept: DERMATOLOGY | Facility: CLINIC | Age: 59
End: 2019-08-29
Payer: COMMERCIAL

## 2019-08-29 VITALS — DIASTOLIC BLOOD PRESSURE: 93 MMHG | SYSTOLIC BLOOD PRESSURE: 130 MMHG | HEART RATE: 68 BPM | OXYGEN SATURATION: 99 %

## 2019-08-29 DIAGNOSIS — C44.519 BASAL CELL CARCINOMA (BCC) OF THORACIC REGION OF BACK: ICD-10-CM

## 2019-08-29 DIAGNOSIS — C44.519 BASAL CELL CARCINOMA (BCC) OF UPPER BACK: Primary | ICD-10-CM

## 2019-08-29 DIAGNOSIS — C44.519 BASAL CELL CARCINOMA (BCC) OF SKIN OF SCAPULAR REGION: ICD-10-CM

## 2019-08-29 PROCEDURE — 11602 EXC TR-EXT MAL+MARG 1.1-2 CM: CPT | Mod: 59 | Performed by: DERMATOLOGY

## 2019-08-29 PROCEDURE — 17262 DSTRJ MAL LES T/A/L 1.1-2.0: CPT | Mod: GC | Performed by: DERMATOLOGY

## 2019-08-29 PROCEDURE — 13102 CMPLX RPR TRUNK ADDL 5CM/<: CPT | Mod: 59 | Performed by: DERMATOLOGY

## 2019-08-29 PROCEDURE — 88305 TISSUE EXAM BY PATHOLOGIST: CPT | Mod: TC | Performed by: DERMATOLOGY

## 2019-08-29 PROCEDURE — 13101 CMPLX RPR TRUNK 2.6-7.5 CM: CPT | Mod: 59 | Performed by: DERMATOLOGY

## 2019-08-29 ASSESSMENT — PAIN SCALES - GENERAL: PAINLEVEL: NO PAIN (0)

## 2019-08-29 NOTE — NURSING NOTE
The following medication was given:     MEDICATION:  Lidocaine with epinephrine 1% 1:[id number]  ROUTE: SQ  SITE: see procedure note  DOSE: 12cc  LOT #: -[id number]  : [place]  EXPIRATION DATE: 1-dec-2019  NDC#: 1785-2140-80   Was there drug waste? no  Multi-dose vial: Yes    Purvi Montaño LPN  August 29, 2019    Steri strips and pressure dressing applied to excision sites on central upper back and left upper back. Vaseline and pressure dressing applied to ED&C site on left mid back.  Wound care instructions reviewed with patient and AVS provided.  Patient verbalized understanding.  No further questions or concerns at this time.      Purvi Montaño LPN

## 2019-08-29 NOTE — PROGRESS NOTES
DERMATOLOGIC EXCISION SURGERY PROCEDURE NOTE     [facility]   [address], [place] [address]     NAME OF PROCEDURE: Excision with complex linear closure  Staff surgeon: John Carr DO  Resident: Jose Muñoz MD   PRE-OPERATIVE DIAGNOSIS:  Basal cell carcinoma, nodular type   POST-OPERATIVE DIAGNOSIS: Same   FINAL EXCISION SIZE(EXCISION DEFECT SIZE): 1.4 x 1.2 cm, with 4 mm margin   FINAL REPAIR LENGTH: 3.6 cm   INDICATIONS: This patient presented with a 0.6 x 0.4 cm basal cell carcinoma of the left upper back. Excision was indicated.   We discussed the principles of treatment and most likely complications including bleeding, infection, scarring, alteration in skin color and sensation, wound dehiscence,muscle weakness in the area, or recurrence of the lesion or disease. We reviewed that on occasion, after healing, a secondary procedure or revision may be recommended in order to obtain the best cosmetic or functional result.     PROCEDURE: The patient was taken to the operative suite. Time-out was performed. The treatment area was anesthetized with 1% lidocaine and epinephrine (1:100,000). The area was washed with Hibiclens, rinsed with saline and draped with sterile towels. The lesion was delineated and excised down to deep subcutaneous fat in an elliptical manner. Hemostasis was obtained by electrocoagulation.     REPAIR: A complex layered linear closure was selected as the procedure which would maximally preserve both function and cosmesis and for the following reasons: 1) the defect was widely undermined; 2) multiple deep plication and layered sutures placed; 3) wound size, depth, tension, and location.   After the excision of the tumor, the area was extensively and carefully undermined using blunt Metzenbaum scissors. Hemostasis was obtained with spot electrocautery and ligation of vessels where necessary. An initial deep plication sutures of 3-0 Vicryl sutures   were placed in the deep, subcutaneous and fascial planes to appose the lateral margins.  The subcutaneous and dermal layers were then closed with additional 3-0 Vicryl sutures. The epidermis was then carefully approximated along the length of the wound using 4-0 Monocryl running subcuticular sutures.   The final wound length was 3.6 cm. A total of 10.0 ml of anesthesia was administered for all surgical sites. Estimated blood loss was less than 10 ml for all surgical sites. A sterile pressure dressing was applied and wound care instructions, with a written handout, were given. The [facility] and ambulatory.    Follow up for wound evaluation as needed.       -----------------------------------------------------------------------------------------------------------    DERMATOLOGIC EXCISION SURGERY PROCEDURE NOTE     [facility]   [address], [place] [address]     NAME OF PROCEDURE: Excision with complex linear closure  Staff surgeon: John Carr DO  Resident: Jose Muñoz MD   PRE-OPERATIVE DIAGNOSIS:  Basal cell carcinoma, superficial and nodular type   POST-OPERATIVE DIAGNOSIS: Same   FINAL EXCISION SIZE(EXCISION DEFECT SIZE): 1.4 x 1.2 cm, with 4 mm margin   FINAL REPAIR LENGTH: 4.8 cm   INDICATIONS: This patient presented with a 0.6 x 0.4 cm basal cell carcinoma of the central upper back. Excision was indicated.   We discussed the principles of treatment and most likely complications including bleeding, infection, scarring, alteration in skin color and sensation, wound dehiscence,muscle weakness in the area, or recurrence of the lesion or disease. We reviewed that on occasion, after healing, a secondary procedure or revision may be recommended in order to obtain the best cosmetic or functional result.     PROCEDURE: The patient was taken to the operative suite. Time-out was performed. The treatment area was  anesthetized with 1% lidocaine and epinephrine (1:100,000). The area was washed with Hibiclens, rinsed with saline and draped with sterile towels. The lesion was delineated and excised down to deep subcutaneous fat in an elliptical manner. Hemostasis was obtained by electrocoagulation.     REPAIR: A complex layered linear closure was selected as the procedure which would maximally preserve both function and cosmesis and for the following reasons: 1) the defect was widely undermined; 2) multiple deep plication and layered sutures placed; 3) wound size, depth, tension, and location.   After the excision of the tumor, the area was extensively and carefully undermined using blunt Metzenbaum scissors. Hemostasis was obtained with spot electrocautery and ligation of vessels where necessary. An initial deep plication sutures of 3-0 Vicryl sutures  were placed in the deep, subcutaneous and fascial planes to appose the lateral margins.  The subcutaneous and dermal layers were then closed with additional 3-0 Vicryl sutures. The epidermis was then carefully approximated along the length of the wound using 4-0 Monocryl running subcuticular sutures.   The final wound length was 4.8 cm. A total of 10.0 ml of anesthesia was administered for all surgical sites. Estimated blood loss was less than 10 ml for all surgical sites. A sterile pressure dressing was applied and wound care instructions, with a written handout, were given. The patient was [facility] alert and ambulatory.    Follow up as needed for wound evaluation.        -----------------------------------------------------------------------------------------------------------    Dermatology Procedure Note: Electrodesiccation and Curettage    PREOPERATIVE DIAGNOSIS: Basal cell carcinoma, superficial type     POSTOPERATIVE DIAGNOSIS: same    LOCATION: left mid back     SIZE: 0.5 x 0.5 cm     Treatment options including electrodessiccation  and curettage (ED and C), excision and topicals were reviewed.  The expected cure rates, healing times and anticipated scars of each option were discussed and the patient elects to proceed with ED and C.     The risks and benefits of the procedure were described to the patient.  These include but are not limited to bleeding, infection, scar, incomplete removal, and recurrence. Written informed consent was obtained. Time-out was performed. The above site was cleansed with and injected with 2.5 mL 1% lidocaine with epinephrine. Once anesthesia was obtained, the site was prepped with Chlorhexidine and rinsed with sterile saline. The lesion was curetted in 3 directions with a 3 mm margin and this was followed by electrodessication.  This process was repeated three times. The defect measured 1.1 x 1.1 cm. Vaseline and a bandage were applied to the wound. The patient tolerated the procedure well and was given post care instructions.    Follow up for wound check as needed.       Staff Physician Comments:   I saw and evaluated the patient with the resident (Dr. Jose Muñoz) and I agree with the assessment and plan as above. I was present for the entire procedure and examination.    John Carr DO    Department of Dermatology  [facility]: Phone: [phone], Fax:[phone]  [facility]: Phone: [phone], Fax: [phone]

## 2019-08-29 NOTE — PATIENT INSTRUCTIONS
Preventive Care:    Colorectal Cancer Screening: During our visit today, we discussed that it is recommended you receive colorectal cancer screening. Please call or make an appointment with your primary care provider to discuss this. You may also call the [facility] scheduling line ([phone]) to set up a colonoscopy appointment.        Wound Care:  Electrodesiccation and Curettage     I will experience scar, altered skin color, bleeding, swelling, pain, crusting and redness. I may experience altered sensation. Risks are excessive bleeding, infection, muscle weakness, thick (hypertrophic or keloidal) scar, and recurrence,. A second procedure may be recommended to obtain the best cosmetic or functional result.    What is electrodesiccation and curettage ?    Scraping off tissue (curettage)    Destroy tissue using electric current or cautery (electrodessication)    How do I perform wound care?    Keep dressing in place for two days. You may shower with the dressing in place(do not get wet)    After 2 days, wash hands and remove dressing. Clean wound with cotton-swab soaked in hydrogen peroxide to remove drainage and crust    Put on a thick layer of Vaseline on the wound using a cotton-swab     Cover the wound with a Band-AidTM to protect from dust and tight clothing    If wound is draining before two days, change your dressing as described above sooner    During wound care, do not allow the area to dry out or form a scab    What do I need?    Hydrogen peroxide     Cotton-swabs     Vaseline or petroleum jelly     Band-AidsTM or dressing supplies as needed     When should I call the doctor?  [facility]: [phone]  [facility]: [phone]  For urgent needs outside of business hours call the [facility] at [phone] and ask for the dermatology resident on call        Excision Wound Care Instructions with Steri strips      I will experience scar,  altered skin color, bleeding, swelling, pain, crusting and redness. I may experience altered sensation. Risks are excessive bleeding, infection, muscle weakness, thick (hypertrophic or keloidal) scar, and recurrence. A second procedure may be recommended to obtain the best cosmetic or functional result.    Possible complications of any surgical procedure are bleeding, infection, scarring, alteration in skin color and sensation, muscle weakness in the area, wound dehiscence or seperation, or recurrence of the lesion or disease. On occasion, after healing, a secondary procedure or revision may be recommended in order to obtain the best cosmetic or functional result.       After your surgery, a pressure bandage will be placed over the area that has sutures. This will help prevent bleeding. Please, follow these instructions as they will help you to prevent complications as your wound heals.        For the First 48 hours After Surgery:    1. Leave the pressure bandage on and keep it dry. If it should come loose, you may retape it, but do not take it off.    2. Relax and take it easy. Do not do any vigorous exercise, heavy lifting, or bending forward. This could cause the wound to bleed.    3. Post-operative pain is usually mild. You may take plain or extra strength Tylenol every 4 hours as needed (do not take more than 4,000mg in one day) if you have no liver problems and your doctor says it is okay. Do not take any medicine that contains aspirin, ibuprofen or motrin unless you have been recommended these by a doctor.  Avoid alcohol and vitamin E as these may increase your tendency to bleed.    4. You may put an ice pack around the bandaged area for 20 minutes every 2-3 hours. This may help reduce swelling, bruising, and pain. Make sure the ice pack is waterproof so that the pressure bandage does not get wet.     5. You may see a small amount of drainage or blood on your pressure bandage. This is normal. However, if  drainage or bleeding continues or saturates the bandage, you will need to apply firm pressure over the bandage with a washcloth for 15 minutes. If bleeding continues after applying pressure for 15 minutes then go to the nearest emergency room.        48 Hours After Surgery:    Carefully remove the outer pressure bandage but leave the steri strips in place for these will continue to give the wound edges extra support as they are healing. You may trim the edges of the steri strip that have curled up but do not remove steri strips until they fall off. You may also now shower but do not saturate the wound or steri strips for this will cause your steri strips to fall off. You should cover the steri strips and wound with a wash cloth while showering to protect the area from water. It is ok if the steri strips become a bit damp but do not saturate.     Daily Wound Care:    1. Once the steri strips fall off wash wound with a mild soap and water.  Use caution when washing the wound, be gentle and do not let the forceful shower stream hit the wound directly. DO NOT WASH WITH HYDROGEN PEROXIDE FOR THIS MIGHT CAUSE THE SUTURES TO DISSOLVE FASTER THAN WHAT WE WANT.     2. PAT DRY.    3. Once steri strips fall off apply Vaseline (from a new container or tube) over the suture line with a Q-tip until it is completely healed. It is very important to keep the wound continuously moist, as wounds heal best in a moist environment.    4. Keep the site covered until site is healed, you can cover it with a Telfa (non-stick) dressing and tape or a band-aid. While your steri strips are on you can use them as your bandage.    5. Once steri strips fall off if you are unable to keep wound covered, you must apply Vaseline every 2 - 3 hours (while awake) to ensure it is being kept moist for optimal healing until completely healed. A dressing overnight is recommended to keep the area moist.        Call Us If:    1. You have pain that is not  controlled with Tylenol.    2. You have signs or symptoms of an infection, such as: fever over 100 degrees F, redness, warmth, or foul-smelling or yellow/creamy drainage from the wound.        Who should I call with questions?  [facility]: [phone]   [facility]: [phone]  For urgent needs outside of business hours call the [facility] at [phone] and ask for the dermatology resident on call

## 2019-09-03 LAB — COPATH REPORT: NORMAL

## 2019-10-02 ENCOUNTER — HEALTH MAINTENANCE LETTER (OUTPATIENT)
Age: 59
End: 2019-10-02

## 2019-12-10 ENCOUNTER — OFFICE VISIT (OUTPATIENT)
Dept: DERMATOLOGY | Facility: CLINIC | Age: 59
End: 2019-12-10
Payer: COMMERCIAL

## 2019-12-10 DIAGNOSIS — L57.8 SUN-DAMAGED SKIN: ICD-10-CM

## 2019-12-10 DIAGNOSIS — L57.3 POIKILODERMA CIVATTE'S: ICD-10-CM

## 2019-12-10 DIAGNOSIS — D22.9 MULTIPLE BENIGN NEVI: ICD-10-CM

## 2019-12-10 DIAGNOSIS — L82.1 SEBORRHEIC KERATOSIS: ICD-10-CM

## 2019-12-10 DIAGNOSIS — Z85.828 HISTORY OF NONMELANOMA SKIN CANCER: ICD-10-CM

## 2019-12-10 DIAGNOSIS — D18.01 CHERRY ANGIOMA: ICD-10-CM

## 2019-12-10 DIAGNOSIS — L81.4 SOLAR LENTIGO: ICD-10-CM

## 2019-12-10 DIAGNOSIS — L57.0 ACTINIC KERATOSIS: Primary | ICD-10-CM

## 2019-12-10 PROCEDURE — 99214 OFFICE O/P EST MOD 30 MIN: CPT | Mod: 25 | Performed by: DERMATOLOGY

## 2019-12-10 PROCEDURE — 17000 DESTRUCT PREMALG LESION: CPT | Performed by: DERMATOLOGY

## 2019-12-10 ASSESSMENT — PAIN SCALES - GENERAL: PAINLEVEL: NO PAIN (0)

## 2019-12-10 NOTE — LETTER
12/10/2019         RE: Audie Harris  [address] [address]        Dear Colleague,    Thank you for referring your patient, Audie Harris, to the [facility]. Please see a copy of my visit note below.    [facility] Dermatology Note    Dermatology Problem List:   1. History of NMSC  - BCC, right lateral neck, s/p biopsy 4/20/2019, s/p excision 5/30/19  - BCC, left upper back lateral, s/p biopsy 4/20/2019, s/p excision 5/30/19  - BCC, left upper back medial, s/p biopsy 4/20/2019, s/p excision 5/30/19  - BCC, left lower back, s/p biopsy 4/20/2019, s/p excision 5/30/19  - nBCC, left upper back, s/p biopsy 7/30/19, s/p excision 8/29/19  - nodular and superficial BCC, central upper back, s/p biopsy 7/30/19, s/p excision 8/29/19  - sBCC, left mid back, s/p biopsy 7/30/19, s/p ED&C 8/29/19  2. AK  -s/p cryotherapy    Encounter Date: Dec 10, 2019    CC:  Chief Complaint   Patient presents with     Skin Check     Hx of NMSC - no concerns     History of Present Illness:  Mr. Audie Harris is a 59 year old male who presents as a follow-up skin check. He was last seen on 8/29/19 by Dr. Carr when three BCCs were treated with excisions and ED&C. No family history of skin cancer. Today he reports no new or changing lesions of concern since his last skin check. His newest excision sites have since healed well, no complications or itch associated. Denies any tender, nonhealing, bleeding skin lesions. Denies any new or changing moles. He notes that every time he is in the sun he usually gets a blistering and peeling sun burn, he does not tan. He did better this summer than past summers, but still got burns. No other concerns addressed today.    Past Medical History:   Patient Active Problem List   Diagnosis     Benign prostatic hyperplasia with urinary obstruction     Central retinal vein occlusion of right eye     Chronic back pain     Erectile  dysfunction     H/O visual field defect     Hyperlipidemia     Franco's syndrome (H)     Past Medical History:   Diagnosis Date     Benign prostate hyperplasia      Central retinal vein occlusion      Deviated septum     Septoplasty     Hyperlipidemia      Franco's disease (H)      Past Surgical History:   Procedure Laterality Date     C RADIAL KERATOTOMY       COLONOSCOPY  08/24/2009     DECOMPRESSION, FUSION LUMBAR POSTERIOR TWO LEVELS, COMBINED       HC REMOVAL NAIL/NAIL BED, W/ AMPUTATION TUFT OF PHALANX       HC REPAIR SLIDING INGUINAL HERNIA Bilateral      LAMINECTOMY, FUSION LUMBAR ONE LEVEL, COMBINED       PROSTATE SURGERY       SEPTOPLASTY       TURP         Social History:  Patient works in [employer]. Spent a lot of time outdoor in his younger years playing "[employer].  Reviewed and left in chart for clinician convenience.       Family History:  No family history of skin cancer    Medications:  Current Outpatient Medications   Medication Sig Dispense Refill     fluticasone (FLONASE) 50 MCG/ACT nasal spray Spray 1 spray into both nostrils daily       mupirocin (BACTROBAN) 2 % external ointment Use 2 times a day to affected areas. (Patient not taking: Reported on 8/29/2019) 22 g 0       No Known Allergies    Review of Systems:  -Constitutional: Patient is otherwise feeling well, in usual state of health.   -Skin: As above in HPI. No additional skin concerns.    Physical exam:  Vitals: There were no vitals taken for this visit.  GEN: This is a well developed, well-nourished male in no acute distress, in a pleasant mood.    SKIN: Total skin excluding the undergarment areas was performed. The exam included the head/face, neck, both arms, chest, back, abdomen, both legs, digits and/or nails and buttocks.   - Escamilla Type II  - Scattered brown macules on sun exposed areas.  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 No pearly appearance or telangiectasias  - There are dome shaped bright red papules on the trunk.   - Multiple regular brown pigmented macules and papules are identified on the body, several with congenital appearance on the back.   - There are waxy stuck on tan to brown papules on the trunk.  - No other lesions of concern on areas examined.     Impression/Plan:    1. History of NMSC. No evidence of recurrence.   - Recommend sunscreens SPF #30 or greater, protective clothing and avoidance of tanning beds.   - Recommended skin exam in 6 months.    2. Sun damaged skin with solar lentigines  - Recommend sunscreens SPF #30 or greater, protective clothing and avoidance of tanning beds.    3. Benign findings including: seborrheic keratoses, cherry angioma  - No further intervention required. Patient to report changes.   - Patient reassured of the benign nature of these lesions.    4. Multiple clinically benign nevi  - No further intervention required. Patient to report changes.   - Patient reassured of the benign nature of these lesions.    5. Actinic keratosis. Discussed precancerous nature of these lesions. Recommended treatment to prevent progression to a squamous cell carcinoma. Advised patient to call for follow up if not resolved in 1 month.   - Cryotherapy procedure note: After verbal consent and discussion of risks and benefits including but no limited to dyspigmentation/scar, blister, and pain, 1 was(were) treated with 1-2mm freeze border for 2 cycles with liquid nitrogen. Post cryotherapy instructions were provided.    Follow-up in 6 months for skin check, earlier for new or changing lesions.     Staff Involved:  Scribe/Staff    Scribe Disclosure  I, Gena Watson am serving as a scribe to document services personally performed by Dr. Zayra Santos MD, based on data collection and the provider's statements to me.     Provider Disclosure:   The documentation recorded by the scribe accurately  reflects the services I personally performed and the decisions made by me.    Zayra Santos MD    Department of Dermatology  [facility]: Phone: [phone], Fax:[phone]  [facility]: Phone: [phone], Fax: [phone]              Again, thank you for allowing me to participate in the care of your patient.        Sincerely,        Zayra Santos MD

## 2019-12-10 NOTE — NURSING NOTE
@Audie Harris's goals for this visit include:   Chief Complaint   Patient presents with     Skin Check     Hx of NMSC - no concerns       He requests these members of his care team be copied on today's visit information: NO    PCP: No Ref-Primary, Physician    Referring Provider:  Zayra Santos MD  [address] [place] [id number]    There were no vitals taken for this visit.    Do you need any medication refills at today's visit? NO    Tameka Neil, [facility]

## 2019-12-10 NOTE — PATIENT INSTRUCTIONS
Cryotherapy    What is it?    Use of a very cold liquid, such as liquid nitrogen, to freeze and destroy abnormal skin cells that need to be removed    What should I expect?    Tenderness and redness    A small blister that might grow and fill with dark purple blood. There may be crusting.    More than one treatment may be needed if the lesions do not go away.    How do I care for the treated area?    Gently wash the area with your hands when bathing.    Use a thin layer of Vaseline to help with healing. You may use a Band-Aid.     The area should heal within 7-10 days and may leave behind a pink or lighter color.     Do not use an antibiotic or Neosporin ointment.     You may take acetaminophen (Tylenol) for pain.     Call your Doctor if you have:    Severe pain    Signs of infection (warmth, redness, cloudy yellow drainage, and or a bad smell)    Questions or concerns    Who should I call with questions?       [facility]: [phone]       [facility]: [phone]       For urgent needs outside of business hours call the [facility] at [phone]        and ask for the dermatology resident on call

## 2019-12-10 NOTE — PROGRESS NOTES
[facility] Dermatology Note    Dermatology Problem List:   1. History of NMSC  - BCC, right lateral neck, s/p biopsy 4/20/2019, s/p excision 5/30/19  - BCC, left upper back lateral, s/p biopsy 4/20/2019, s/p excision 5/30/19  - BCC, left upper back medial, s/p biopsy 4/20/2019, s/p excision 5/30/19  - BCC, left lower back, s/p biopsy 4/20/2019, s/p excision 5/30/19  - nBCC, left upper back, s/p biopsy 7/30/19, s/p excision 8/29/19  - nodular and superficial BCC, central upper back, s/p biopsy 7/30/19, s/p excision 8/29/19  - sBCC, left mid back, s/p biopsy 7/30/19, s/p ED&C 8/29/19  2. AK  -s/p cryotherapy    Encounter Date: Dec 10, 2019    CC:  Chief Complaint   Patient presents with     Skin Check     Hx of NMSC - no concerns     History of Present Illness:  Mr. Audie Harris is a 59 year old male who presents as a follow-up skin check. He was last seen on 8/29/19 by Dr. Carr when three BCCs were treated with excisions and ED&C. No family history of skin cancer. Today he reports no new or changing lesions of concern since his last skin check. His newest excision sites have since healed well, no complications or itch associated. Denies any tender, nonhealing, bleeding skin lesions. Denies any new or changing moles. He notes that every time he is in the sun he usually gets a blistering and peeling sun burn, he does not tan. He did better this summer than past summers, but still got burns. No other concerns addressed today.    Past Medical History:   Patient Active Problem List   Diagnosis     Benign prostatic hyperplasia with urinary obstruction     Central retinal vein occlusion of right eye     Chronic back pain     Erectile dysfunction     H/O visual field defect     Hyperlipidemia     Franco's syndrome (H)     Past Medical History:   Diagnosis Date     Benign prostate hyperplasia      Central retinal vein occlusion      Deviated septum     Septoplasty     Hyperlipidemia      Franco's  disease (H)      Past Surgical History:   Procedure Laterality Date     C RADIAL KERATOTOMY       COLONOSCOPY  08/24/2009     DECOMPRESSION, FUSION LUMBAR POSTERIOR TWO LEVELS, COMBINED       HC REMOVAL NAIL/NAIL BED, W/ AMPUTATION TUFT OF PHALANX       HC REPAIR SLIDING INGUINAL HERNIA Bilateral      LAMINECTOMY, FUSION LUMBAR ONE LEVEL, COMBINED       PROSTATE SURGERY       SEPTOPLASTY       TURP         Social History:  Patient works in [employer]. Spent a lot of time outdoor in his younger years playing [employer].  Reviewed and left in chart for clinician convenience.       Family History:  No family history of skin cancer    Medications:  Current Outpatient Medications   Medication Sig Dispense Refill     fluticasone (FLONASE) 50 MCG/ACT nasal spray Spray 1 spray into both nostrils daily       mupirocin (BACTROBAN) 2 % external ointment Use 2 times a day to affected areas. (Patient not taking: Reported on 8/29/2019) 22 g 0       No Known Allergies    Review of Systems:  -Constitutional: Patient is otherwise feeling well, in usual state of health.   -Skin: As above in HPI. No additional skin concerns.    Physical exam:  Vitals: There were no vitals taken for this visit.  GEN: This is a well developed, well-nourished male in no acute distress, in a pleasant mood.    SKIN: Total skin excluding the undergarment areas was performed. The exam included the head/face, neck, both arms, chest, back, abdomen, both legs, digits and/or nails and buttocks.   - Escamilla Type II  - Scattered brown macules on sun exposed areas.  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 No pearly appearance or telangiectasias  - There are dome shaped bright red papules on the trunk.   - Multiple regular brown pigmented macules and papules are identified on the body, several with congenital appearance on the back.   - There are waxy stuck  on tan to brown papules on the trunk.  - No other lesions of concern on areas examined.     Impression/Plan:    1. History of NMSC. No evidence of recurrence.   - Recommend sunscreens SPF #30 or greater, protective clothing and avoidance of tanning beds.   - Recommended skin exam in 6 months.    2. Sun damaged skin with solar lentigines  - Recommend sunscreens SPF #30 or greater, protective clothing and avoidance of tanning beds.    3. Benign findings including: seborrheic keratoses, cherry angioma  - No further intervention required. Patient to report changes.   - Patient reassured of the benign nature of these lesions.    4. Multiple clinically benign nevi  - No further intervention required. Patient to report changes.   - Patient reassured of the benign nature of these lesions.    5. Actinic keratosis. Discussed precancerous nature of these lesions. Recommended treatment to prevent progression to a squamous cell carcinoma. Advised patient to call for follow up if not resolved in 1 month.   - Cryotherapy procedure note: After verbal consent and discussion of risks and benefits including but no limited to dyspigmentation/scar, blister, and pain, 1 was(were) treated with 1-2mm freeze border for 2 cycles with liquid nitrogen. Post cryotherapy instructions were provided.    Follow-up in 6 months for skin check, earlier for new or changing lesions.     Staff Involved:  Scribe/Staff    Scribe Disclosure  I, Gena Watson, am serving as a scribe to document services personally performed by Dr. Zayra Santos MD, based on data collection and the provider's statements to me.     Provider Disclosure:   The documentation recorded by the scribe accurately reflects the services I personally performed and the decisions made by me.    Zayra Santos MD    Department of Dermatology  [facility]: Phone: [phone], Fax:[phone]  [facility]  [facility]: Phone: [phone], Fax: [phone]

## 2020-06-25 ENCOUNTER — VIRTUAL VISIT (OUTPATIENT)
Dept: DERMATOLOGY | Facility: CLINIC | Age: 60
End: 2020-06-25
Payer: COMMERCIAL

## 2020-06-25 DIAGNOSIS — D48.5 NEOPLASM OF UNCERTAIN BEHAVIOR OF SKIN: Primary | ICD-10-CM

## 2020-06-25 DIAGNOSIS — Z85.828 HISTORY OF NONMELANOMA SKIN CANCER: ICD-10-CM

## 2020-06-25 PROCEDURE — 99212 OFFICE O/P EST SF 10 MIN: CPT | Mod: TEL | Performed by: DERMATOLOGY

## 2020-06-25 NOTE — LETTER
"    6/25/2020         RE: Audie Harris  [id number] [address] [address]        Dear Colleague,    Thank you for referring your patient, Audie Harris, to the [facility]. Please see a copy of my visit note below.        Teledermatology Nurse Call for RETURN patients seen within the last 3 years:    The patient was contacted by phone and we reviewed, \"Due to the coronavirus pandemic, we are calling to review your visit and offer you a teledermatology visit where you send in photos via [employer]. These photos will be seen by an MD or RENATO. This will be billed to you and your insurance.\"  The patient was also told that \"a teledermatology visit is not as thorough as an in-person visit and that the quality of the photograph sent may not be of the same quality as that taken by the dermatology clinic, but the patient would like to proceed with an teledermatology because of National Emergency Regarding Coronavirus disease (COVID 19) Outbreak.\"  \"If a prescription is necessary we can send it directly to your pharmacy.  If lab work is needed we can place an order for that and you can then stop by our lab to have the test done at a later time.\"    The patient understood that they may receive a call from the clinic to review additional history, may still be instructed to come to clinic even after photo review and be billed for both visits with an MD. Visits are billed at different rates depending on your insurance coverage. Please reach out to your insurance provider with any questions.They were told that a photo assessment does not replace an in person skin exam. The patient understood that teledermatology is not for urgent issues and would require up to 3 business days for review. The patient denied skin pain, fever, mucosal symptoms (lesions, blisters, sores in the mouth, nose, eyes, or genitals) No IF PATIENT ENDORSES ANY OF THESE STOP AND PAGE  ON CALL ATTENDING. IF OTHER POSSIBLY " URGENT SYMPTOMS THEN PAGE PHYSICIAN YOU ARE SCHEDULING WITH OR ON CALL IF NO ANSWER.       The patient chose to:                                                                                                                                                                                                                    Consent to a teledermatology visit with [employer] photos. The patient understood they must upload a [employer] photo for this visit to be completed. They indicated that the photo will be taken at their home address(if other address please document here). Patient told nursing these are already uploaded .  The patient was instructed to take photos of all all areas of concern and all areas of any rash from near and far away.                                                                                                                                                                                                                               The patient is verified to be in the [facility] at the time of the encounter.                                                                                                                                                                                                              Patient concerns for this return visit: lesion under left earlobe, rough and sensitive to touch new since last visit in December 2019    Photo instructions reviewed with patients as below:  -ALL patient needs to send photos unless they have a phone only visit approved by the clinic:  o To send photos to your doctor, respond to the message in [employer] as many times as needed to upload your photos. Each message allows for 3 photos.  o For spots or lesions of concern, please take at least 2 photos of each site you are worried about (at different angles if needed, and at least one close up and one farther away so we can tell where it is on the body. Be sure all photographs are in  focus)  o For rashes, take photos of the entire body because it is important for us to see which areas are involved and which areas are not involved.  At a minimum, please include photos of the arms, legs, front of trunk, back of trunk, face, and a few close-ups of the rash.  Leave undergarments in place unless the rash involves the skin in these areas  o For acne, please take photos of the face, upper chest and neck, and upper chest and back  o For hair loss, please send views of the top of the head, sides of the head, back of the head and a picture of your face with your hair pulled back. Also, a photos of both hands with nails.    -For ADULT NEW patients video visits are needed, to receive an invitation and connect with your provider, the [employer] video visit technology must be accessible from your smartphone or personal device. Please click the link below for setup instructions: Omnidrone/videovisit    Nursing tasks completed  -Pharmacy preference was updated.  -The nurse has dropped in the AVS information *(For adults the phrase is umdermhteleavs and for pediatrics it is their own) for the physician to route in the AVS.                                                                                                                                                                                                                         -The patient was told to contact the clinic if they have not received correspondence within 72 hours.     [facility]  Store and Forward and Telephone [phone]      Impression and Recommendations (Patient Counseled on the Following):  1. NUB, left posterior neck, suspicious for nodular BCC. Discussed that this is suspicious enough for BCC that I think he needs referral to surgery without biopsy. I will defer to Dr. Carr on if same day frozen section or empiric excision is indicated.    2. History of many BCCs in the last year: Recommended prevention with  diligent sunprotection and the addition of nicotinamide 500mg BID. Patient will research this further and start if he feels comfortable.       Follow-up:   6 months for skin check, sooner for concerns.     Staff only:    Zayra Santos MD    Department of Dermatology  [facility]: Phone: [phone], Fax:[phone]  [facility]: Phone: [phone], Fax: [phone]          _____________________________________________________________________________    Dermatology Problem List:  1. History of NMSC  - BCC, right lateral neck, s/p biopsy 4/20/2019, s/p excision 5/30/19  - BCC, left upper back lateral, s/p biopsy 4/20/2019, s/p excision 5/30/19  - BCC, left upper back medial, s/p biopsy 4/20/2019, s/p excision 5/30/19  - BCC, left lower back, s/p biopsy 4/20/2019, s/p excision 5/30/19  - nBCC, left upper back, s/p biopsy 7/30/19, s/p excision 8/29/19  - nodular and superficial BCC, central upper back, s/p biopsy 7/30/19, s/p excision 8/29/19  - sBCC, left mid back, s/p biopsy 7/30/19, s/p ED&C 8/29/19  2. AK  -s/p cryotherapy  3. *NUB, left posterior neck, concerning for BCC    Encounter Date: Jun 25, 2020    CC:   Chief Complaint   Patient presents with     RECHECK     lesion under left earlobe, rough and sensitive to touch new since last visit in December 2019       History of Present Illness:  I have reviewed the teledermatology information and the nursing intake corresponding to this issue. Audie Harris is a 60 year old male who presents via teledermatology for a spot of concern.    Noticed it toward the beginning of the year. It was not present at skin check in December. Spot is on the back of the left neck. It is tender to touch (he describes as sensitive). It feels similar to past BCCs.     When asked about taking a supplement to prevent skin cancers, he states he would like  to research this more and think about the ingredients and chemical structure before pursing. He is not on any oral meds regularly.       ROS: Patient is generally feeling well today    Physical Examination:  General: Well-appearing male, appropriately-developed individual.  Skin: Focused examination within the teledermatology photograph(s) including neck was performed.   -Pink pearly papule with rolled borders on the left posterior neck.     Labs:  None    Past Medical History:   Patient Active Problem List   Diagnosis     Benign prostatic hyperplasia with urinary obstruction     Central retinal vein occlusion of right eye     Chronic back pain     Erectile dysfunction     H/O visual field defect     Hyperlipidemia     Franco's syndrome (H)     Past Medical History:   Diagnosis Date     Benign prostate hyperplasia      Central retinal vein occlusion      Deviated septum     Septoplasty     Hyperlipidemia      Franco's disease (H)      Past Surgical History:   Procedure Laterality Date     C RADIAL KERATOTOMY       COLONOSCOPY  08/24/2009     DECOMPRESSION, FUSION LUMBAR POSTERIOR TWO LEVELS, COMBINED       HC REMOVAL NAIL/NAIL BED, W/ AMPUTATION TUFT OF PHALANX       HC REPAIR SLIDING INGUINAL HERNIA Bilateral      LAMINECTOMY, FUSION LUMBAR ONE LEVEL, COMBINED       PROSTATE SURGERY       SEPTOPLASTY       TURP         Social History:  Patient reports that he has never smoked. He has never used smokeless tobacco. He reports that he does not drink alcohol. Patient works in [employer]. Spent a lot of time outdoor in his younger years playing [employer].  Reviewed and left in chart for clinician convenience.      Family History:  No family history of skin cancer  Reviewed and left in chart for clinician convenience.       Medications:  Current Outpatient Medications   Medication     fluticasone (FLONASE) 50 MCG/ACT nasal spray     No current facility-administered medications for this visit.           No Known  Allergies      _____________________________________________________________________________    Teledermatology information:  - Location of patient: [place]  - Patient presented as: return  - Location of teledermatologist:  [facility] )  - Reason teledermatology is appropriate:  of National Emergency Regarding Coronavirus disease (COVID 19) Outbreak  - Image quality and interpretability: acceptable  - Physician has received verbal consent for a Video/Photos Visit from the patient? Yes  - In-person dermatology visit recommendation: yes - for physician visit  - Date of images: 6/25/20  - Service start time: 3:44  - Service end time: 3:53  - Date of report: 6/25/2020         Again, thank you for allowing me to participate in the care of your patient.        Sincerely,        Zayra Santos MD

## 2020-06-25 NOTE — PROGRESS NOTES
"Teledermatology Nurse Call for RETURN patients seen within the last 3 years:    The patient was contacted by phone and we reviewed, \"Due to the coronavirus pandemic, we are calling to review your visit and offer you a teledermatology visit where you send in photos via [employer]. These photos will be seen by an MD or RENATO. This will be billed to you and your insurance.\"  The patient was also told that \"a teledermatology visit is not as thorough as an in-person visit and that the quality of the photograph sent may not be of the same quality as that taken by the dermatology clinic, but the patient would like to proceed with an teledermatology because of National Emergency Regarding Coronavirus disease (COVID 19) Outbreak.\"  \"If a prescription is necessary we can send it directly to your pharmacy.  If lab work is needed we can place an order for that and you can then stop by our lab to have the test done at a later time.\"    The patient understood that they may receive a call from the clinic to review additional history, may still be instructed to come to clinic even after photo review and be billed for both visits with an MD. Visits are billed at different rates depending on your insurance coverage. Please reach out to your insurance provider with any questions.They were told that a photo assessment does not replace an in person skin exam. The patient understood that teledermatology is not for urgent issues and would require up to 3 business days for review. The patient denied skin pain, fever, mucosal symptoms (lesions, blisters, sores in the mouth, nose, eyes, or genitals) No IF PATIENT ENDORSES ANY OF THESE STOP AND PAGE  ON CALL ATTENDING. IF OTHER POSSIBLY URGENT SYMPTOMS THEN PAGE PHYSICIAN YOU ARE SCHEDULING WITH OR ON CALL IF NO ANSWER.       The patient chose to:                                                                                                                                                         "                                                            Consent to a teledermatology visit with [email or web address] photos. The patient understood they must upload a [email or web address] photo for this visit to be completed. They indicated that the photo will be taken at their home address(if other address please document here). Patient told nursing these are already uploaded .  The patient was instructed to take photos of all all areas of concern and all areas of any rash from near and far away.                                                                                                                                                                                                                               The patient is verified to be in the [facility] at the time of the encounter.                                                                                                                                                                                                              Patient concerns for this return visit: lesion under left earlobe, rough and sensitive to touch new since last visit in December 2019    Photo instructions reviewed with patients as below:  -ALL patient needs to send photos unless they have a phone only visit approved by the clinic:  o To send photos to your doctor, respond to the message in [email or web address] as many times as needed to upload your photos. Each message allows for 3 photos.  o For spots or lesions of concern, please take at least 2 photos of each site you are worried about (at different angles if needed, and at least one close up and one farther away so we can tell where it is on the body. Be sure all photographs are in focus)  o For rashes, take photos of the entire body because it is important for us to see which areas are involved and which areas are not involved.  At a minimum, please include photos of the arms, legs, front of trunk, back of trunk, face, and a few close-ups of the  rash.  Leave undergarments in place unless the rash involves the skin in these areas  o For acne, please take photos of the face, upper chest and neck, and upper chest and back  o For hair loss, please send views of the top of the head, sides of the head, back of the head and a picture of your face with your hair pulled back. Also, a photos of both hands with nails.    -For ADULT NEW patients video visits are needed, to receive an invitation and connect with your provider, the [employer] video visit technology must be accessible from your smartphone or personal device. Please click the link below for setup instructions: [employer]/videovisit    Nursing tasks completed  -Pharmacy preference was updated.  -The nurse has dropped in the AVS information *(For adults the phrase is umdermhteleavs and for pediatrics it is their own) for the physician to route in the AVS.                                                                                                                                                                                                                         -The patient was told to contact the clinic if they have not received correspondence within 72 hours.     [facility]  Store and Forward and Telephone [phone]      Impression and Recommendations (Patient Counseled on the Following):  1. NUB, left posterior neck, suspicious for nodular BCC. Discussed that this is suspicious enough for BCC that I think he needs referral to surgery without biopsy. I will defer to Dr. Carr on if same day frozen section or empiric excision is indicated.    2. History of many BCCs in the last year: Recommended prevention with diligent sunprotection and the addition of nicotinamide 500mg BID. Patient will research this further and start if he feels comfortable.       Follow-up:   6 months for skin check, sooner for concerns.     Staff only:    Zayra Santos MD    Department  of Dermatology  [facility]: Phone: [phone], Fax:[phone]  [facility]: Phone: [phone], Fax: [phone]          _____________________________________________________________________________    Dermatology Problem List:  1. History of NMSC  - BCC, right lateral neck, s/p biopsy 4/20/2019, s/p excision 5/30/19  - BCC, left upper back lateral, s/p biopsy 4/20/2019, s/p excision 5/30/19  - BCC, left upper back medial, s/p biopsy 4/20/2019, s/p excision 5/30/19  - BCC, left lower back, s/p biopsy 4/20/2019, s/p excision 5/30/19  - nBCC, left upper back, s/p biopsy 7/30/19, s/p excision 8/29/19  - nodular and superficial BCC, central upper back, s/p biopsy 7/30/19, s/p excision 8/29/19  - sBCC, left mid back, s/p biopsy 7/30/19, s/p ED&C 8/29/19  2. AK  -s/p cryotherapy  3. *NUB, left posterior neck, concerning for BCC    Encounter Date: Jun 25, 2020    CC:   Chief Complaint   Patient presents with     RECHECK     lesion under left earlobe, rough and sensitive to touch new since last visit in December 2019       History of Present Illness:  I have reviewed the teledermatology information and the nursing intake corresponding to this issue. Audie Harris is a 60 year old male who presents via teledermatology for a spot of concern.    Noticed it toward the beginning of the year. It was not present at skin check in December. Spot is on the back of the left neck. It is tender to touch (he describes as sensitive). It feels similar to past BCCs.     When asked about taking a supplement to prevent skin cancers, he states he would like to research this more and think about the ingredients and chemical structure before pursing. He is not on any oral meds regularly.       ROS: Patient is generally feeling well today    Physical Examination:  General: Well-appearing male, appropriately-developed  individual.  Skin: Focused examination within the teledermatology photograph(s) including neck was performed.   -Pink pearly papule with rolled borders on the left posterior neck.     Labs:  None    Past Medical History:   Patient Active Problem List   Diagnosis     Benign prostatic hyperplasia with urinary obstruction     Central retinal vein occlusion of right eye     Chronic back pain     Erectile dysfunction     H/O visual field defect     Hyperlipidemia     Franco's syndrome (H)     Past Medical History:   Diagnosis Date     Benign prostate hyperplasia      Central retinal vein occlusion      Deviated septum     Septoplasty     Hyperlipidemia      Franco's disease (H)      Past Surgical History:   Procedure Laterality Date     C RADIAL KERATOTOMY       COLONOSCOPY  08/24/2009     DECOMPRESSION, FUSION LUMBAR POSTERIOR TWO LEVELS, COMBINED       HC REMOVAL NAIL/NAIL BED, W/ AMPUTATION TUFT OF PHALANX       HC REPAIR SLIDING INGUINAL HERNIA Bilateral      LAMINECTOMY, FUSION LUMBAR ONE LEVEL, COMBINED       PROSTATE SURGERY       SEPTOPLASTY       TURP         Social History:  Patient reports that he has never smoked. He has never used smokeless tobacco. He reports that he does not drink alcohol. Patient works in [employer]. Spent a lot of time outdoor in his younger years playing [employer].  Reviewed and left in chart for clinician convenience.      Family History:  No family history of skin cancer  Reviewed and left in chart for clinician convenience.       Medications:  Current Outpatient Medications   Medication     fluticasone (FLONASE) 50 MCG/ACT nasal spray     No current facility-administered medications for this visit.           No Known Allergies      _____________________________________________________________________________    Teledermatology information:  - Location of patient: [place]  - Patient presented as: return  - Location of teledermatologist:  ([facility] )  - Reason  teledermatology is appropriate:  of National Emergency Regarding Coronavirus disease (COVID 19) Outbreak  - Image quality and interpretability: acceptable  - Physician has received verbal consent for a Video/Photos Visit from the patient? Yes  - In-person dermatology visit recommendation: yes - for physician visit  - Date of images: 6/25/20  - Service start time: 3:44  - Service end time: 3:53  - Date of report: 6/25/2020

## 2020-06-25 NOTE — Clinical Note
Patient has lesion concerning for BCC and will need to be schedule with Dr. Carr. I am not sure if Dr. Carr will want to empirically move to excision or do a frozen section first, so probably better to schedule with enough time for the frozen section just in case.  Thanks, LF

## 2020-06-25 NOTE — PATIENT INSTRUCTIONS
[facility] Teledermatology Visit    Thank you for allowing us to participate in your care. Your findings, instructions and follow-up plan are as follows:    1. Spot on the neck: concerning for BCC. We will schedule you for surgery to remove.  2. Consider starting nicotinamide 500mg twice a day to reduce your risk of skin cancers in the future.    When should I call my doctor?    If you are worsening or not improving, please, contact us or seek urgent care as noted below.     Who should I call with questions (adults)?    [facility] (adult and pediatric): [phone]     [facility] (adult): [phone]    For urgent needs outside of business hours call the [facility] at [phone] and ask for the dermatology resident on call    If this is a medical emergency and you are unable to reach an ER, Call [phone]      Who should I call with questions (pediatric)?  [facility]- Pediatric Dermatology  Dr. Magy Carney, Dr. Bam Elam, Dr. Maame Kaiser, Kanchan Juárez, PA  Dr. Kym Schroeder, Dr. Nayla Masterson & Dr. Davy Lawrence  Non Urgent  Nurse Triage Line; [phone]- Lala and Shruthi MENEZES Care Coordinators   Kaelyn (/Complex ) [phone]    If you need a prescription refill, please contact your pharmacy. Refills are approved or denied by our Physicians during normal business hours, Monday through Fridays  Per office policy, refills will not be granted if you have not been seen within the past year (or sooner depending on your child's condition)    Scheduling Information:  Pediatric Appointment Scheduling and Call Center [phone]  Radiology Scheduling- [phone]  Sedation Unit Scheduling- [phone]  Marcella Scheduling- General [phone]; Pediatric Dermatology [phone]  Main  Services: [phone]  Polish: [phone]  Deisy:  [phone]  Mina/Bradley/Albanian: [phone]  Preadmission Nursing Department Fax Number: [phone] (Fax all pre-operative paperwork to this number)    For urgent matters arising during evenings, weekends, or holidays that cannot wait for normal business hours please call [phone] and ask for the Dermatology Resident On-Call to be paged.

## 2020-06-29 ENCOUNTER — TELEPHONE (OUTPATIENT)
Dept: DERMATOLOGY | Facility: CLINIC | Age: 60
End: 2020-06-29

## 2020-06-29 NOTE — TELEPHONE ENCOUNTER
I left a message for patient to call [facility].  Schedule with Lilly for frozen biopsy and likely Mohs (schedule like Mohs).    Sarah Daniels RN

## 2020-06-29 NOTE — LETTER
"      Mr.Mitchell Tommy Harris  [address] [facility] [address]        July 6, 2020    Dear ,    You have an upcoming appointment with Dr. Carr for Mohs surgery. It is scheduled for 7/23/20 at 8:00 AM. Please check-in 15 minutes prior to your appointment at check-in desk \"D\" on the 2nd floor. Our address is [address] [place] [address].  Please review these important reminders:    1) Expect to be here the majority of the morning.  The Mohs surgical procedure can be an extensive surgery requiring multiple stages. Each stage may take 1-2 hours.     2) You may eat breakfast. You may bring snacks or beverages with you.  3) Take all normal medications morning of surgery -- unless otherwise indicated by your physician   If you have an artificial heart valve we will prescribe an antibiotic. Please take one hour prior to surgery.     4) You will need a  to be with you if your surgical site is close to your eyes. You can get swelling/bruising immediately after surgery and will go home with a big bulky bandage that could obstruct your view of driving safely.     5) Wear comfortable clothing -- preferably a button down shirt or a loose fitted shirt (to avoid pulling over pressure bandage off when you get home). If your surgery site is on your leg, try wearing loose fitted pants. If your surgery site is on your arm, try wearing a short-sleeve shirt.     6) Bring reading material or other items to help pass time. We do have internet access available if you own a laptop, iPad, etc.    7) Women - do NOT wear make-up. We will be washing it off anyone needing surgery on the face     8) Do NOT stop blood thinning medication unless instructed to do so by your surgeon. This will include: Warfarin, Coumadin, Eliquis, Jantoven, Aspirin, Plavix and Pradaxa. If you are taking Warfarin or Coumadin, please have your INR blood test results sent to our office no more than 7 days prior to your surgery.     9) Tylenol is " a good alternative to take for headaches and pain, and can be taken throughout your surgery and postoperative recovery.    If you need to reschedule or have any questions or concerns, please call me at [phone].    Sincerely,      Candace Vasquez

## 2020-06-29 NOTE — TELEPHONE ENCOUNTER
----- Message from John Carr MD sent at 6/26/2020  4:43 PM CDT -----  Regarding: FW: Treatment plan?  My treatment recommendation is Frozen biopsy and likely Mohs. Ok to schedule after telephone screening. Thank you.       ----- Message -----  From: Sarah Daniels RN  Sent: 6/26/2020   9:15 AM CDT  To: John Carr MD, #  Subject: Treatment plan?                                  Danielle Longoria saw this patient on Thursday.  Please review note and advise on how you'd like us to schedule the appointment.    Thanks,  Zayra Wagner MD sent to [facility] [place]           Patient has lesion concerning for BCC and will need to be schedule with Dr. Carr. I am not sure if Dr. Carr will want to empirically move to excision or do a frozen section first, so probably better to schedule with enough time for the frozen section just in case.   Thanks,  LF

## 2020-06-29 NOTE — LETTER
"    Mr.Mitchell Tommy Harris  [address] [facility] [address]        July 6, 2020    Dear ,    You have an upcoming appointment with Dr. Carr for Mohs surgery. It is scheduled for 7/23/2020  at 8:00 AM. Please check-in 15 minutes prior to your appointment at check-in desk \"D\" on the 2nd floor. Our address is [address] [place] [address].  Please review these important reminders:    1) Expect to be here the majority of the morning.  The Mohs surgical procedure can be an extensive surgery requiring multiple stages. Each stage may take 1-2 hours.     2) You may eat breakfast. You may bring snacks or beverages with you.  3) Take all normal medications morning of surgery -- unless otherwise indicated by your physician   If you have an artificial heart valve we will prescribe an antibiotic. Please take one hour prior to surgery.     4) You will need a  to be with you if your surgical site is close to your eyes. You can get swelling/bruising immediately after surgery and will go home with a big bulky bandage that could obstruct your view of driving safely.     5) Wear comfortable clothing -- preferably a button down shirt or a loose fitted shirt (to avoid pulling over pressure bandage off when you get home). If your surgery site is on your leg, try wearing loose fitted pants. If your surgery site is on your arm, try wearing a short-sleeve shirt.     6) Bring reading material or other items to help pass time. We do have internet access available if you own a laptop, iPad, etc.    7) Women - do NOT wear make-up. We will be washing it off anyone needing surgery on the face     8) Do NOT stop blood thinning medication unless instructed to do so by your surgeon. This will include: Warfarin, Coumadin, Eliquis, Jantoven, Aspirin, Plavix and Pradaxa. If you are taking Warfarin or Coumadin, please have your INR blood test results sent to our office no more than 7 days prior to your surgery.     9) Tylenol " is a good alternative to take for headaches and pain, and can be taken throughout your surgery and postoperative recovery.    If you need to reschedule or have any questions or concerns, please call me at [phone].    Sincerely,      Candace MENEZES

## 2020-07-01 NOTE — TELEPHONE ENCOUNTER
Pt called, no answer. VM Id's pt. Left detailed message with reason for call and  for pt to call the [facility] back at [phone]....Candace Vasquez RN

## 2020-07-03 NOTE — TELEPHONE ENCOUNTER
Left message for patient to call [facility] [place] back at [phone]    Purvi Montaño LPN

## 2020-07-06 NOTE — TELEPHONE ENCOUNTER
[facility] Dermatologic Surgery Pre-Surgery Screening Note     Pre-screening Information:        Medications/Allergies  Medications and allergies review with patient: Yes,     Current Outpatient Medications   Medication Sig Dispense Refill     fluticasone (FLONASE) 50 MCG/ACT nasal spray Spray 1 spray into both nostrils daily       No Known Allergies    Pertinent Labs:  Last Creatine: No results found for: CR  Last INR: No results found for: INR    Other Questions:    Reminded patient to take any order prophylactic antibiotics 1 hour prior to appointment: n/a    If on a prescribed anticoagulant, advised patient to continue or check with prescribing provider: n/a    If on an OTC anticoagulant/supplement, advised patient to hold these medications 10-14 days prior to surgery and resume 48 hrs after surgery: Yes, n/a     Please be aware that this can be an all day procedure. Please bring your daily medications and food/cash. Encourage patient to eat prior to procedure(s). After care instructions were reviewed with patient.    If any positives, send to RN to initiate antibiotic prophylaxis protocol and/or anticoagulation management protocol    Reviewed by:    Candace Vasquez RN

## 2020-07-06 NOTE — TELEPHONE ENCOUNTER
Pt called and left a  returning clinic's call..Pt called, No answer.  does not identify pt. Left general message for pt to call the [facility] clinic back at [phone]....Candace Vasquez RN

## 2020-07-23 ENCOUNTER — OFFICE VISIT (OUTPATIENT)
Dept: DERMATOLOGY | Facility: CLINIC | Age: 60
End: 2020-07-23
Payer: COMMERCIAL

## 2020-07-23 VITALS — DIASTOLIC BLOOD PRESSURE: 80 MMHG | SYSTOLIC BLOOD PRESSURE: 108 MMHG | HEART RATE: 68 BPM | OXYGEN SATURATION: 97 %

## 2020-07-23 DIAGNOSIS — Z85.828 HISTORY OF NONMELANOMA SKIN CANCER: ICD-10-CM

## 2020-07-23 DIAGNOSIS — Z87.2 HISTORY OF ACTINIC KERATOSIS: ICD-10-CM

## 2020-07-23 DIAGNOSIS — D48.5 NEOPLASM OF UNCERTAIN BEHAVIOR OF SKIN OF BACK: ICD-10-CM

## 2020-07-23 DIAGNOSIS — C44.41 BASAL CELL CARCINOMA (BCC) OF LEFT SIDE OF NECK: ICD-10-CM

## 2020-07-23 DIAGNOSIS — D48.5 NEOPLASM OF UNCERTAIN BEHAVIOR OF SKIN OF CHEEK: ICD-10-CM

## 2020-07-23 DIAGNOSIS — D48.5 NEOPLASM OF UNCERTAIN BEHAVIOR OF SKIN OF THIGH: ICD-10-CM

## 2020-07-23 DIAGNOSIS — D48.5 NEOPLASM OF UNCERTAIN BEHAVIOR OF SKIN OF NECK: Primary | ICD-10-CM

## 2020-07-23 DIAGNOSIS — D22.9 MULTIPLE BENIGN NEVI: ICD-10-CM

## 2020-07-23 PROCEDURE — 11103 TANGNTL BX SKIN EA SEP/ADDL: CPT | Mod: GC | Performed by: DERMATOLOGY

## 2020-07-23 PROCEDURE — 11102 TANGNTL BX SKIN SINGLE LES: CPT | Mod: 59 | Performed by: DERMATOLOGY

## 2020-07-23 PROCEDURE — 88305 TISSUE EXAM BY PATHOLOGIST: CPT | Mod: TC | Performed by: DERMATOLOGY

## 2020-07-23 PROCEDURE — 12042 INTMD RPR N-HF/GENIT2.6-7.5: CPT | Mod: 59 | Performed by: DERMATOLOGY

## 2020-07-23 PROCEDURE — 99213 OFFICE O/P EST LOW 20 MIN: CPT | Mod: 25 | Performed by: DERMATOLOGY

## 2020-07-23 PROCEDURE — 11622 EXC S/N/H/F/G MAL+MRG 1.1-2: CPT | Mod: GC | Performed by: DERMATOLOGY

## 2020-07-23 ASSESSMENT — PAIN SCALES - GENERAL: PAINLEVEL: MILD PAIN (2)

## 2020-07-23 NOTE — PROGRESS NOTES
[facility] Dermatology Note    Dermatology Problem List:   1. History of NMSC  - BCC, left posterior neck, Excision 7/23/20  - BCC, right lateral neck, s/p excision 5/30/19  - BCC, left upper back lateral, s/p excision 5/30/19  - BCC, left upper back medial, s/p excision 5/30/19  - BCC, left lower back, s/p excision 5/30/19  - nBCC, left upper back, s/p excision 8/29/19  - nodular and superficial BCC, central upper back, s/p excision 8/29/19  - sBCC, left mid back, s/p ED&C 8/29/19  2. AK  -s/p cryotherapy    Encounter Date: Jul 23, 2020    CC:  Chief Complaint   Patient presents with     Procedure     possible BCC left posterior neck frozen bx to MOHS      Skin Check     hx NMSC     History of Present Illness:  Mr. Audie Harris is a 60 year old male who presents as a follow-up skin check and excision from virtual visit with Dr. Santos on 6/25/2020 for suspected BCC on left posterior neck. No family history of skin cancer. Denies any new or changing moles.     Past Medical History:   Patient Active Problem List   Diagnosis     Benign prostatic hyperplasia with urinary obstruction     Central retinal vein occlusion of right eye     Chronic back pain     Erectile dysfunction     H/O visual field defect     Hyperlipidemia     Franco's syndrome (H)     Past Medical History:   Diagnosis Date     Benign prostate hyperplasia      Central retinal vein occlusion      Deviated septum     Septoplasty     Hyperlipidemia      Franco's disease (H)      Past Surgical History:   Procedure Laterality Date     C RADIAL KERATOTOMY       COLONOSCOPY  08/24/2009     DECOMPRESSION, FUSION LUMBAR POSTERIOR TWO LEVELS, COMBINED       HC REMOVAL NAIL/NAIL BED, W/ AMPUTATION TUFT OF PHALANX       HC REPAIR SLIDING INGUINAL HERNIA Bilateral      LAMINECTOMY, FUSION LUMBAR ONE LEVEL, COMBINED       PROSTATE SURGERY       SEPTOPLASTY       TURP         Social History:  Patient works in [employer].   Spent time  outdoors in youth     Family History:  No family history of skin cancer    Medications:  Current Outpatient Medications   Medication Sig Dispense Refill     fluticasone (FLONASE) 50 MCG/ACT nasal spray Spray 1 spray into both nostrils daily         No Known Allergies    Review of Systems:  -Constitutional: Patient is otherwise feeling well, in usual state of health.   -Skin: As above in HPI. No additional skin concerns.    Physical exam:  Vitals: /87   Pulse 83   GEN: This is a well developed, well-nourished male in no acute distress, in a pleasant mood.    SKIN: Total skin excluding the undergarment areas was performed. The exam included the head/face, neck, both arms, chest, back, abdomen, both legs, digits and/or nails.    - Left posterior neck with pink, pearly papule with central hemorrhagic crust and peripheral arborizing blood vessels.   - Right cheek with skin colored to pink/yellow and pearly, dome shaped papule, approximately 2 mm in diameter. Few small vessels noted on dermoscopy   - Left mid back with brown to dark brown, oval macule, approximately 5 mm in diameter. A reticular pigment pattern is noted on dermoscopy with circular areas of pigment drop-out  - Right inner thigh with pedunculated pink papule.    - Scattered brown macules on sun exposed areas.  - Poikilodermatous plaques on the neck  - Over the back are scattered, well healed linear scars  - Scattered, dome shaped bright red papules on the trunk.       Impression/Plan:    1. Neoplasm suspicious for Basal cell carcinoma, left posterior neck  - Given the clinical appearance and the patient's history, this lesion is very concerning for a basal cell carcinoma.   - Treatment with frozen biopsy and subsequent MMS vs standard excision was discussed with the patient. He elects for excision, see procedure note below.     2. Neoplasm of uncertain behavior of the right cheek   - Clinical and dermoscopic features concerning for basal cell  carcinoma vs irritated dermal nevus  - Biopsy today     Procedure Note: Biopsy by shave technique  The risks and benefits of the procedure were described to the patient. These include but are not limited to bleeding, infection, scar, incomplete removal, and non-diagnostic biopsy. Written informed consent was obtained. The right cheek was cleansed with an alcohol pad and injected with 1% lidocaine with epinephrine. Once anesthesia was obtained, a biopsy was performed with Gilette blade. The tissue was placed in a labeled container with formalin and sent to pathology. Hemostasis was achieved with aluminum chloride. Vaseline and a bandage were applied to the wound. The patient tolerated the procedure well and was given post biopsy care instructions.    3. Neoplasm of uncertain behavior of the left mid back  - Clinical and dermoscopic features are suggestive predominantly junctional nevus or lentigo. The areas of pigment drop-out are concerning for a more malignant process.   - Biopsy today     Shave biopsy:  After discussion of benefits and risks including but not limited to bleeding/bruising, pain/swelling, infection, scar, incomplete removal, nerve damage/numbness, recurrence, and non-diagnostic biopsy, written consent, verbal consent and photographs were obtained. Time-out was performed. The area was cleaned with isopropyl alcohol. 0.5mL of 1% lidocaine with epinephrine was injected to obtain adequate anesthesia of the lesion on the left mid back. A shave biopsy was performed. Hemostasis was achieved with aluminium chloride. Vaseline and a sterile dressing were applied. The patient tolerated the procedure and no complications were noted. The patient was provided with verbal and written post care instructions.     4. Neoplasm of uncertain behavior of the right inner thigh  - Clinical impression is that of an irritated nevus, biopsy today     After discussion of benefits and risks including but not limited to  bleeding, infection, scar, incomplete removal, recurrence, and non-diagnostic biopsy, written consent and photographs were obtained. The area was cleaned with isopropyl alcohol. 1mL of 1% lidocaine with epinephrine was injected to obtain adequate anesthesia of the lesion on the right inner thigh. A shave biopsy was performed. Hemostasis was achieved with aluminium chloride. Vaseline and a sterile dressing were applied. The patient tolerated the procedure and no complications were noted. The patient was provided with verbal and written post care instructions.       5. History of NMSC   - No evidence of recurrence at sites of previous treatment .   - Recommended skin exam in 6 months.    6. Sun damaged skin with solar lentigines  7. Benign findings including: seborrheic keratoses, cherry angioma  8. Multiple clinically benign nevi  - Patient reassured of the benign nature of these lesions.  - No further intervention required. Patient to report changes.     Follow-up in 6 months for skin check, earlier depending on the results of today's biopsies.         DERMATOLOGY EXCISION PROCEDURE NOTE      NAME OF PROCEDURE: Excision intermediate layered linear closure  Staff surgeon: Dr. Carr  Resident: Dr. Arthur  Scrub Nurse: Purvi Montaño LPN    PRE-OPERATIVE DIAGNOSIS:  Suspected Basal Cell Carcinoma  POST-OPERATIVE DIAGNOSIS: Same   LOCATION: left posterior neck  FINAL EXCISION SIZE(DEFECT SIZE): 1.7X1.5 cm  MARGIN: 0.5 cm  FINAL REPAIR LENGTH: 5.2 cm   ANESTHESIA: 6cc 1% lidocaine with 1:100,000 epinephrine      INDICATIONS: This patient presented with a 0.7X0.5cm Suspected Basal Cell Carcinoma. Excision was indicated. We discussed the principles of treatment and most likely complications including scarring, bleeding, infection, incomplete excision, wound dehiscence, pain, nerve damage, and recurrence. Informed consent was obtained and the patient underwent the procedure as follows:    PROCEDURE: The patient was taken  to the operative suite. Time-out was performed.  The treatment area was anesthetized with 1% lidocaine with epinephrine. The area was prepped with Chlorhexidine and rinsed with sterile saline and draped with sterile towels. The lesion was delineated and excised down to subcutaneous fat in a elliptical manner. Hemostasis was obtained by electrocoagulation.     REPAIR: An intermediate layered linear closure was selected as the procedure which would maximally preserve both function and cosmesis.    After the excision of the tumor, the area was carefully  undermined. Hemostasis was obtained with bipolar electrocoagulation.  Closure was oriented so that the wound was in the patient's natural skin tension lines. The subcutaneous and dermal layers were then closed with 4-0 Monocryl sutures. The epidermis was then carefully approximated along the length of the wound using 5-0 Fast Absorbing Gut simple running sutures.     Estimated blood loss was less than 10 ml for all surgical sites. A sterile pressure dressing was applied and wound care instructions, with a written handout, were given. The [facility] alert and ambulatory.    Dr. Carr was immediately available for the entire surgery and was physicially present for the key portions of the procedure.    Anatomic Pathology Results: pending  Staff Involved:  Scribe/Staff    Scribe Disclosure  I, Purvi Montaño LPN, am serving as a scribe to document services personally performed by Dr. Zayra Santos MD, based on data collection and the provider's statements to me.     Provider Disclosure:   The documentation recorded by the scribe accurately reflects the services I personally performed and the decisions made by me.    Maurice Arthur MD  PGY-6    Micrographic Surgery and Dermatologic Oncology Fellow  July 23, 2020      Staff Physician Comments:   I saw and evaluated the patient with the Mohs Surgery Fellow (Dr. Richards  Jerson) and I agree with the assessment and plan and the above description of the procedure. I was present for the key portions of the procedure and entire exam.    John Carr DO    Department of Dermatology  [facility]: Phone: [phone], Fax:[phone]  [facility]: Phone: [phone], Fax: [phone]    Care and Laboratory Testing Performed at:  [facility]   Dermatology Clinic  [address] [place] [address]

## 2020-07-23 NOTE — LETTER
7/23/2020         RE: Audie Harris  [id number] [facility] [address]        Dear Colleague,    Thank you for referring your patient, Audie Harris, to the [facility]. Please see a copy of my visit note below.    [facility] Dermatology Note    Dermatology Problem List:   1. History of NMSC  - BCC, left posterior neck, Excision 7/23/20  - BCC, right lateral neck, s/p excision 5/30/19  - BCC, left upper back lateral, s/p excision 5/30/19  - BCC, left upper back medial, s/p excision 5/30/19  - BCC, left lower back, s/p excision 5/30/19  - nBCC, left upper back, s/p excision 8/29/19  - nodular and superficial BCC, central upper back, s/p excision 8/29/19  - sBCC, left mid back, s/p ED&C 8/29/19  2. AK  -s/p cryotherapy    Encounter Date: Jul 23, 2020    CC:  Chief Complaint   Patient presents with     Procedure     possible BCC left posterior neck frozen bx to MOHS      Skin Check     hx NMSC     History of Present Illness:  Mr. Audie Harris is a 60 year old male who presents as a follow-up skin check and excision from virtual visit with Dr. Santos on 6/25/2020 for suspected BCC on left posterior neck. No family history of skin cancer. Denies any new or changing moles.     Past Medical History:   Patient Active Problem List   Diagnosis     Benign prostatic hyperplasia with urinary obstruction     Central retinal vein occlusion of right eye     Chronic back pain     Erectile dysfunction     H/O visual field defect     Hyperlipidemia     Franco's syndrome (H)     Past Medical History:   Diagnosis Date     Benign prostate hyperplasia      Central retinal vein occlusion      Deviated septum     Septoplasty     Hyperlipidemia      Franco's disease (H)      Past Surgical History:   Procedure Laterality Date     C RADIAL KERATOTOMY       COLONOSCOPY  08/24/2009     DECOMPRESSION, FUSION LUMBAR POSTERIOR TWO LEVELS, COMBINED       HC REMOVAL NAIL/NAIL  BED, W/ AMPUTATION TUFT OF PHALANX       HC REPAIR SLIDING INGUINAL HERNIA Bilateral      LAMINECTOMY, FUSION LUMBAR ONE LEVEL, COMBINED       PROSTATE SURGERY       SEPTOPLASTY       TURP         Social History:  Patient works in [employer].   Spent time outdoors in youth     Family History:  No family history of skin cancer    Medications:  Current Outpatient Medications   Medication Sig Dispense Refill     fluticasone (FLONASE) 50 MCG/ACT nasal spray Spray 1 spray into both nostrils daily         No Known Allergies    Review of Systems:  -Constitutional: Patient is otherwise feeling well, in usual state of health.   -Skin: As above in HPI. No additional skin concerns.    Physical exam:  Vitals: /87   Pulse 83   GEN: This is a well developed, well-nourished male in no acute distress, in a pleasant mood.    SKIN: Total skin excluding the undergarment areas was performed. The exam included the head/face, neck, both arms, chest, back, abdomen, both legs, digits and/or nails.    - Left posterior neck with pink, pearly papule with central hemorrhagic crust and peripheral arborizing blood vessels.   - Right cheek with skin colored to pink/yellow and pearly, dome shaped papule, approximately 2 mm in diameter. Few small vessels noted on dermoscopy   - Left mid back with brown to dark brown, oval macule, approximately 5 mm in diameter. A reticular pigment pattern is noted on dermoscopy with circular areas of pigment drop-out  - Right inner thigh with pedunculated pink papule.    - Scattered brown macules on sun exposed areas.  - Poikilodermatous plaques on the neck  - Over the back are scattered, well healed linear scars  - Scattered, dome shaped bright red papules on the trunk.       Impression/Plan:    1. Neoplasm suspicious for Basal cell carcinoma, left posterior neck  - Given the clinical appearance and the patient's history, this lesion is very concerning for a basal cell carcinoma.   - Treatment with frozen  biopsy and subsequent MMS vs standard excision was discussed with the patient. He elects for excision, see procedure note below.     2. Neoplasm of uncertain behavior of the right cheek   - Clinical and dermoscopic features concerning for basal cell carcinoma vs irritated dermal nevus  - Biopsy today     Procedure Note: Biopsy by shave technique  The risks and benefits of the procedure were described to the patient. These include but are not limited to bleeding, infection, scar, incomplete removal, and non-diagnostic biopsy. Written informed consent was obtained. The right cheek was cleansed with an alcohol pad and injected with 1% lidocaine with epinephrine. Once anesthesia was obtained, a biopsy was performed with Gilette blade. The tissue was placed in a labeled container with formalin and sent to pathology. Hemostasis was achieved with aluminum chloride. Vaseline and a bandage were applied to the wound. The patient tolerated the procedure well and was given post biopsy care instructions.    3. Neoplasm of uncertain behavior of the left mid back  - Clinical and dermoscopic features are suggestive predominantly junctional nevus or lentigo. The areas of pigment drop-out are concerning for a more malignant process.   - Biopsy today     Shave biopsy:  After discussion of benefits and risks including but not limited to bleeding/bruising, pain/swelling, infection, scar, incomplete removal, nerve damage/numbness, recurrence, and non-diagnostic biopsy, written consent, verbal consent and photographs were obtained. Time-out was performed. The area was cleaned with isopropyl alcohol. 0.5mL of 1% lidocaine with epinephrine was injected to obtain adequate anesthesia of the lesion on the left mid back. A shave biopsy was performed. Hemostasis was achieved with aluminium chloride. Vaseline and a sterile dressing were applied. The patient tolerated the procedure and no complications were noted. The patient was provided with  verbal and written post care instructions.     4. Neoplasm of uncertain behavior of the right inner thigh  - Clinical impression is that of an irritated nevus, biopsy today     After discussion of benefits and risks including but not limited to bleeding, infection, scar, incomplete removal, recurrence, and non-diagnostic biopsy, written consent and photographs were obtained. The area was cleaned with isopropyl alcohol. 1mL of 1% lidocaine with epinephrine was injected to obtain adequate anesthesia of the lesion on the right inner thigh. A shave biopsy was performed. Hemostasis was achieved with aluminium chloride. Vaseline and a sterile dressing were applied. The patient tolerated the procedure and no complications were noted. The patient was provided with verbal and written post care instructions.       5. History of NMSC   - No evidence of recurrence at sites of previous treatment .   - Recommended skin exam in 6 months.    6. Sun damaged skin with solar lentigines  7. Benign findings including: seborrheic keratoses, cherry angioma  8. Multiple clinically benign nevi  - Patient reassured of the benign nature of these lesions.  - No further intervention required. Patient to report changes.     Follow-up in 6 months for skin check, earlier depending on the results of today's biopsies.         DERMATOLOGY EXCISION PROCEDURE NOTE      NAME OF PROCEDURE: Excision intermediate layered linear closure  Staff surgeon: Dr. Carr  Resident: Dr. Arthur  Scrub Nurse: Purvi Montaño LPN    PRE-OPERATIVE DIAGNOSIS:  Suspected Basal Cell Carcinoma  POST-OPERATIVE DIAGNOSIS: Same   LOCATION: left posterior neck  FINAL EXCISION SIZE(DEFECT SIZE): 1.7X1.5 cm  MARGIN: 0.5 cm  FINAL REPAIR LENGTH: 5.2 cm   ANESTHESIA: 6cc 1% lidocaine with 1:100,000 epinephrine      INDICATIONS: This patient presented with a 0.7X0.5cm Suspected Basal Cell Carcinoma. Excision was indicated. We discussed the principles of treatment and most likely  complications including scarring, bleeding, infection, incomplete excision, wound dehiscence, pain, nerve damage, and recurrence. Informed consent was obtained and the patient underwent the procedure as follows:    PROCEDURE: The patient was taken to the operative suite. Time-out was performed.  The treatment area was anesthetized with 1% lidocaine with epinephrine. The area was prepped with Chlorhexidine and rinsed with sterile saline and draped with sterile towels. The lesion was delineated and excised down to subcutaneous fat in a elliptical manner. Hemostasis was obtained by electrocoagulation.     REPAIR: An intermediate layered linear closure was selected as the procedure which would maximally preserve both function and cosmesis.    After the excision of the tumor, the area was carefully  undermined. Hemostasis was obtained with bipolar electrocoagulation.  Closure was oriented so that the wound was in the patient's natural skin tension lines. The subcutaneous and dermal layers were then closed with 4-0 Monocryl sutures. The epidermis was then carefully approximated along the length of the wound using 5-0 Fast Absorbing Gut simple running sutures.     Estimated blood loss was less than 10 ml for all surgical sites. A sterile pressure dressing was applied and wound care instructions, with a written handout, were given. The patient was [facility] alert and ambulatory.    Dr. Carr was immediately available for the entire surgery and was physicially present for the key portions of the procedure.    Anatomic Pathology Results: pending  Staff Involved:  Scribe/Staff    Scribe Disclosure  I, Purvi Montaño LPN, am serving as a scribe to document services personally performed by Dr. Zayra Santos MD, based on data collection and the provider's statements to me.     Provider Disclosure:   The documentation recorded by the scribe accurately reflects the services I personally  performed and the decisions made by me.    Maurice Arthur MD  PGY-6    Micrographic Surgery and Dermatologic Oncology Fellow  July 23, 2020      Staff Physician Comments:   I saw and evaluated the patient with the Mohs Surgery Fellow (Dr. Maurice Arthur) and I agree with the assessment and plan and the above description of the procedure. I was present for the key portions of the procedure and entire exam.    John Carr DO    Department of Dermatology  [facility]: Phone: [phone], Fax:[phone]  [facility]: Phone: [phone], Fax: [phone]    Again, thank you for allowing me to participate in the care of your patient.        Sincerely,        John Carr MD

## 2020-07-23 NOTE — NURSING NOTE
Audie Harris's goals for this visit include:   Chief Complaint   Patient presents with     Procedure     possible BCC left posterior neck frozen bx to MOHS      Skin Check     hx NMSC       He requests these members of his care team be copied on today's visit information:     PCP: No Ref-Primary, Physician    Referring Provider:  No referring provider defined for this encounter.    /87   Pulse 83     Do you need any medication refills at today's visit? No    Purvi Montaño LPN

## 2020-07-23 NOTE — PATIENT INSTRUCTIONS
Excision Wound Care Instructions  I will experience scar, altered skin color, bleeding, swelling, pain, crusting and redness. I may experience altered sensation. Risks are excessive bleeding, infection, muscle weakness, thick (hypertrophic or keloidal) scar, and recurrence,. A second procedure may be recommended to obtain the best cosmetic or functional result.  Possible complications of any surgical procedure are bleeding, infection, scarring, alteration in skin color and sensation, muscle weakness in the area, wound dehiscence or seperation, or recurrence of the lesion or disease. On occasion, after healing, a secondary procedure or revision may be recommended in order to obtain the best cosmetic or functional result.   After your surgery, a pressure bandage will be placed over the area that has sutures. This will help prevent bleeding. Please follow these instructions as they will help you to prevent complications as your wound heals.  For the First 48 hours After Surgery:  1. Leave the pressure bandage on and keep it dry. If it should come loose, you may retape it, but do not take it off.  2. Relax and take it easy. Do not do any vigorous exercise, heavy lifting, or bending forward. This could cause the wound to bleed.  3. Post-operative pain is usually mild. You may take plain or extra strength Tylenol every 4 hours as needed (do not take more than 4,000mg in one day). Do not take any medicine that contains aspirin, ibuprofen or motrin unless you have been recommended these by a doctor.  Avoid alcohol and vitamin E as these may increase your tendency to bleed.  4. You may put an ice pack around the bandaged area for 20 minutes every 2-3 hours. This may help reduce swelling, bruising, and pain. Make sure the ice pack is waterproof so that the pressure bandage does not get wet.   5. You may see a small amount of drainage or blood on your pressure bandage. This is normal. However, if drainage or bleeding continues  or saturates the bandage, you will need to apply firm pressure over the bandage with a washcloth for 15 minutes. If bleeding continues after applying pressure for 15 minutes then go to the nearest emergency room.  48 Hours After Surgery  Carefully remove the bandage and start daily wound care and dressing changes. You may also now shower and get the wound wet. Wash wound with a mild soap and water.  Use caution when washing the wound. Be gentle and do not let the forceful shower stream hit the wound directly.  PAT dry.  Daily Wound Care:  1. Wash wound with a mild soap and water.  Use caution when washing the wound, be gentle and do not let the forceful shower stream hit the wound directly.  2. PAT DRY.  3. Apply Vaseline (from a new container or tube) over the suture line with a Q-tip. It is very important to keep the wound continuously moist, as wounds heal best in a moist environment.  4.  Keep the site covered until sutures are removed, you can cover it with a Telfa (non-stick) dressing and tape or a band-aid.    5. If you are unable to keep wound covered, you must apply Vaseline every 2 - 3 hours (while awake) to ensure it is being kept moist for optimal healing. A dressing overnight is recommended to keep the area moist.   Call Us If:  1. You have pain that is not controlled with Tylenol.  2. You have signs or symptoms of an infection, such as: fever over 100 degrees F, redness, warmth, or foul-smelling or yellow/creamy drainage from the wound.  Who should I call with questions?    [facility]: [phone]     [facility]: [phone]    For urgent needs outside of business hours call the [facility] at [phone] and ask for the dermatology resident on call    Wound Care After a Biopsy    What is a skin biopsy?  A skin biopsy allows the doctor to examine a very small piece of tissue under the microscope to determine the diagnosis and the  best treatment for the skin condition. A local anesthetic (numbing medicine)  is injected with a very small needle into the skin area to be tested. A small piece of skin is taken from the area. Sometimes a suture (stitch) is used.     What are the risks of a skin biopsy?  I will experience scar, bleeding, swelling, pain, crusting and redness. I may experience incomplete removal or recurrence. Risks of this procedure are excessive bleeding, bruising, infection, nerve damage, numbness, thick (hypertrophic or keloidal) scar and non-diagnostic biopsy.    How should I care for my wound for the first 24 hours?    Keep the wound dry and covered for 24 hours    If it bleeds, hold direct pressure on the area for 15 minutes. If bleeding does not stop then go to the emergency room    Avoid strenuous exercise the first 1-2 days or as your doctor instructs you    How should I care for the wound after 24 hours?    After 24 hours, remove the bandage    You may bathe or shower as normal    If you had a scalp biopsy, you can shampoo as usual and can use shower water to clean the biopsy site daily    Clean the wound twice a day with gentle soap and water    Do not scrub, be gentle    Apply white petroleum/Vaseline after cleaning the wound with a cotton swab or a clean finger, and keep the site covered with a Bandaid /bandage. Bandages are not necessary with a scalp biopsy    If you are unable to cover the site with a Bandaid /bandage, re-apply ointment 2-3 times a day to keep the site moist. Moisture will help with healing    Avoid strenuous activity for first 1-2 days    Avoid lakes, rivers, pools, and oceans until the stitches are removed or the site is healed    How do I clean my wound?    Wash hands thoroughly with soap or use hand  before all wound care    Clean the wound with gentle soap and water    Apply white petroleum/Vaseline  to wound after it is clean    Replace the Bandaid /bandage to keep the wound covered for  the first few days or as instructed by your doctor    If you had a scalp biopsy, warm shower water to the area on a daily basis should suffice    What should I use to clean my wound?     Cotton-tipped applicators (Qtips )    White petroleum jelly (Vaseline ). Use a clean new container and use Q-tips to apply.    Bandaids   as needed    Gentle soap     How should I care for my wound long term?    Do not get your wound dirty    Keep up with wound care for one week or until the area is healed.    A small scab will form and fall off by itself when the area is completely healed. The area will be red and will become pink in color as it heals. Sun protection is very important for how your scar will turn out. Sunscreen with an SPF 30 or greater is recommended once the area is healed.    You should have some soreness but it should be mild and slowly go away over several days. Talk to your doctor about using tylenol for pain,    When should I call my doctor?  If you have increased:     Pain or swelling    Pus or drainage (clear or slightly yellow drainage is ok)    Temperature over 100F    Spreading redness or warmth around wound    When will I hear about my results?  The biopsy results can take 2-3 weeks to come back. The clinic will call you with the results, send you a mycEstimotet message, or have you schedule a follow-up clinic or phone time to discuss the results. Contact our clinics if you do not hear from us in 3 weeks.     Who should I call with questions?    [facility]: [phone]     [facility]: [phone]    For urgent needs outside of business hours call the [facility] at [phone] and ask for the dermatology resident on call

## 2020-07-28 LAB — COPATH REPORT: NORMAL

## 2020-07-30 ENCOUNTER — TELEPHONE (OUTPATIENT)
Dept: DERMATOLOGY | Facility: CLINIC | Age: 60
End: 2020-07-30

## 2020-07-30 NOTE — TELEPHONE ENCOUNTER
John Spence MD sent to [facility] [place]               Please call the patient with pathology results.     Excision margins are clear.   The other 3 spots shaved were benign.        The right cheek was a benign mole.        The back was a mole with mild atypia. It appeared to be completely removed with the biopsy and can be checked when he returns for skin cancer screening.        The right inner thigh was a benign mole too.     As long as the wounds are healing well, no additional surgery is necessary.   Thank you.    Associated Results     Dermatological path order and indications   Order: [id number]   Status:  Final result   Visible to patient:  Yes (MyChart) Dx:  Neoplasm of uncertain behavior of ski...   Component  7d ago   Copath Report  Patient Name: ANIRUDH BREWER   MR#: [id number]   Specimen #: [id number]   Collected: 7/23/2020   Received: 7/24/2020   Reported: 7/28/2020 08:21   Ordering Phy(s): JOHN SPENCE     For improved result formatting, select 'View Enhanced Report Format' under    Linked Documents section.     SPECIMEN(S):   A: Skin, left posterior neck   B: Skin, right cheek, shave   C: Skin, left mid back, shave   D: Skin, right inner thigh, shave     FINAL DIAGNOSIS:   A. Skin, left posterior neck:   - Basal cell carcinoma, superficial type, completely excised - (see   description)     B. Skin, right cheek, shave:   - Intradermal melanocytic nevus - (see description)     C. Skin, left mid back, shave:   - Compound dysplastic nevus with mild atypia - (see description)     D. Skin, right inner thigh, shave:   - Intradermal melanocytic nevus - (see description)

## 2020-07-30 NOTE — TELEPHONE ENCOUNTER
Results reviewed with Jaison.  He verbalized understanding.  He stated he has localized swelling (marble-sized) at one end of the incision site.  He denies bleeding, pain or erythema.  I asked him to send a photo and he stated he will just monitor it and if it worsens he will reach out.  Follow up appointment confirmed.   Sarah Daniels RN

## 2020-08-06 ENCOUNTER — VIRTUAL VISIT (OUTPATIENT)
Dept: DERMATOLOGY | Facility: CLINIC | Age: 60
End: 2020-08-06
Payer: COMMERCIAL

## 2020-08-06 DIAGNOSIS — C44.41 BASAL CELL CARCINOMA (BCC) OF LEFT SIDE OF NECK: Primary | ICD-10-CM

## 2020-08-06 PROCEDURE — 99024 POSTOP FOLLOW-UP VISIT: CPT | Mod: TEL | Performed by: DERMATOLOGY

## 2020-08-06 ASSESSMENT — PAIN SCALES - GENERAL: PAINLEVEL: NO PAIN (0)

## 2020-08-06 NOTE — PROGRESS NOTES
[facility] Dermatology Record:  Store and Forward and Telephone [phone]    Dermatology Problem List:  1. History of NMSC  - BCC, left posterior neck, Excision 7/23/20  - BCC, right lateral neck, s/p excision 5/30/19  - BCC, left upper back lateral, s/p excision 5/30/19  - BCC, left upper back medial, s/p excision 5/30/19  - BCC, left lower back, s/p excision 5/30/19  - nBCC, left upper back, s/p excision 8/29/19  - nodular and superficial BCC, central upper back, s/p excision 8/29/19  - sBCC, left mid back, s/p ED&C 8/29/19  2. AK  -s/p cryotherapy    Encounter Date: Aug 6, 2020    CC:   Chief Complaint   Patient presents with     Wound Check     2 weeks post excision - left posterior neck       History of Present Illness:  Audie Harris is a 60 year old male who presents for follow up of BCC excision on the left posterior neck.    Wound seems to be healing appropriately. He is doing wound care as instructed and sutures seem to be dissolving slowly. Denies excessive pain and weeping.     No problems with shave biopsy sites.   ROS: Patient is generally feeling well today     Physical Examination:  General: Well-appearing, appropriately-developed individual.  Skin: Focused examination including left posterior neck was performed.   - pink linear plaque at site of prior surgery.    No adjacent edema, erythema, or weeping.     Labs:  N/A    Past Medical History:   Patient Active Problem List   Diagnosis     Benign prostatic hyperplasia with urinary obstruction     Central retinal vein occlusion of right eye     Chronic back pain     Erectile dysfunction     H/O visual field defect     Hyperlipidemia     Franco's syndrome (H)     Past Medical History:   Diagnosis Date     Benign prostate hyperplasia      Central retinal vein occlusion      Deviated septum     Septoplasty     Hyperlipidemia      Franco's disease (H)      Past Surgical History:   Procedure Laterality Date     C RADIAL KERATOTOMY       COLONOSCOPY   08/24/2009     DECOMPRESSION, FUSION LUMBAR POSTERIOR TWO LEVELS, COMBINED       HC REMOVAL NAIL/NAIL BED, W/ AMPUTATION TUFT OF PHALANX       HC REPAIR SLIDING INGUINAL HERNIA Bilateral      LAMINECTOMY, FUSION LUMBAR ONE LEVEL, COMBINED       PROSTATE SURGERY       SEPTOPLASTY       TURP         Social History:  Patient reports that he has never smoked. He has never used smokeless tobacco. He reports that he does not drink alcohol.    Family History:  Family History   Problem Relation Age of Onset     Bladder Cancer Mother      Bladder Cancer Maternal Grandfather      Cerebrovascular Disease Paternal Grandmother      Bladder Cancer Maternal Aunt      Bladder Cancer Maternal Uncle        Medications:  Current Outpatient Medications   Medication     fluticasone (FLONASE) 50 MCG/ACT nasal spray     No current facility-administered medications for this visit.           No Known Allergies        Impression and Recommendations (Patient Counseled on the Following):  1. BCC, left posterior neck.  S/p Excision  Wound healing appropriately.   Continue dressings or apply H2O2 to dissolve remainint sutures.   Once sutures dissolved, ok to treat as normal skin.   Protect the scar from the sun via avoidance and sunscreen.     Follow-up: Jan 2021 with Dr. Santos     Staff only    John Carr DO    Department of Dermatology  [facility]: Phone: [phone], Fax:[phone]  [facility]: Phone: [phone], Fax: [phone]      _____________________________________________________________________________    Teledermatology information:  - Location of patient: [place]  - Patient presented as: return  - Location of teledermatologist:  ([facility] )  - Reason teledermatology is appropriate:  of National Emergency Regarding Coronavirus disease (COVID 19) Outbreak  - Image quality and  interpretability: acceptable  - Physician has received verbal consent for a Video/Photos Visit from the patient? Yes  - In-person dermatology visit recommendation: no  - Date of images: 8/7/20  - Service start time:1637  - Service end time:1641  - Date of report: 8/6/2020

## 2020-08-06 NOTE — LETTER
8/6/2020         RE: Audie Harris  [address] [address] [address]        Dear Colleague,    Thank you for referring your patient, Audie Harris, to the [facility]. Please see a copy of my visit note below.    [facility] Dermatology Record:  Store and Forward and Telephone [phone]    Dermatology Problem List:  1. History of NMSC  - BCC, left posterior neck, Excision 7/23/20  - BCC, right lateral neck, s/p excision 5/30/19  - BCC, left upper back lateral, s/p excision 5/30/19  - BCC, left upper back medial, s/p excision 5/30/19  - BCC, left lower back, s/p excision 5/30/19  - nBCC, left upper back, s/p excision 8/29/19  - nodular and superficial BCC, central upper back, s/p excision 8/29/19  - sBCC, left mid back, s/p ED&C 8/29/19  2. AK  -s/p cryotherapy    Encounter Date: Aug 6, 2020    CC:   Chief Complaint   Patient presents with     Wound Check     2 weeks post excision - left posterior neck       History of Present Illness:  Audie Harris is a 60 year old male who presents for follow up of BCC excision on the left posterior neck.    Wound seems to be healing appropriately. He is doing wound care as instructed and sutures seem to be dissolving slowly. Denies excessive pain and weeping.     No problems with shave biopsy sites.   ROS: Patient is generally feeling well today     Physical Examination:  General: Well-appearing, appropriately-developed individual.  Skin: Focused examination including left posterior neck was performed.   - pink linear plaque at site of prior surgery.    No adjacent edema, erythema, or weeping.     Labs:  N/A    Past Medical History:   Patient Active Problem List   Diagnosis     Benign prostatic hyperplasia with urinary obstruction     Central retinal vein occlusion of right eye     Chronic back pain     Erectile dysfunction     H/O visual field defect     Hyperlipidemia     Franco's syndrome (H)     Past Medical History:   Diagnosis  Date     Benign prostate hyperplasia      Central retinal vein occlusion      Deviated septum     Septoplasty     Hyperlipidemia      Franco's disease (H)      Past Surgical History:   Procedure Laterality Date     C RADIAL KERATOTOMY       COLONOSCOPY  08/24/2009     DECOMPRESSION, FUSION LUMBAR POSTERIOR TWO LEVELS, COMBINED       HC REMOVAL NAIL/NAIL BED, W/ AMPUTATION TUFT OF PHALANX       HC REPAIR SLIDING INGUINAL HERNIA Bilateral      LAMINECTOMY, FUSION LUMBAR ONE LEVEL, COMBINED       PROSTATE SURGERY       SEPTOPLASTY       TURP         Social History:  Patient reports that he has never smoked. He has never used smokeless tobacco. He reports that he does not drink alcohol.    Family History:  Family History   Problem Relation Age of Onset     Bladder Cancer Mother      Bladder Cancer Maternal Grandfather      Cerebrovascular Disease Paternal Grandmother      Bladder Cancer Maternal Aunt      Bladder Cancer Maternal Uncle        Medications:  Current Outpatient Medications   Medication     fluticasone (FLONASE) 50 MCG/ACT nasal spray     No current facility-administered medications for this visit.           No Known Allergies        Impression and Recommendations (Patient Counseled on the Following):  1. BCC, left posterior neck.  S/p Excision  Wound healing appropriately.   Continue dressings or apply H2O2 to dissolve remainint sutures.   Once sutures dissolved, ok to treat as normal skin.   Protect the scar from the sun via avoidance and sunscreen.     Follow-up: Jan 2021 with Dr. Santos     Staff only    John Carr DO    Department of Dermatology  [facility]: Phone: [phone], Fax:[phone]  [facility]: Phone: [phone], Fax: [phone]      _____________________________________________________________________________    Teledermatology information:  - Location of  patient: [place]  - Patient presented as: return  - Location of teledermatologist:  ([facility] )  - Reason teledermatology is appropriate:  of National Emergency Regarding Coronavirus disease (COVID 19) Outbreak  - Image quality and interpretability: acceptable  - Physician has received verbal consent for a Video/Photos Visit from the patient? Yes  - In-person dermatology visit recommendation: no  - Date of images: 8/7/20  - Service start time:1637  - Service end time:1641  - Date of report: 8/6/2020       Again, thank you for allowing me to participate in the care of your patient.        Sincerely,        John Carr MD

## 2020-08-06 NOTE — NURSING NOTE
"Teledermatology Nurse Call for RETURN patients seen within the last 3 years:      The patient was contacted by phone and we reviewed they have a visit in teledermatology upcoming with an MD or RENATO;  Importantly, \"a teledermatology visit may not be as thorough as an in-person visit, and the quality of the photograph and/or video sent may not be of the same quality as that taken by the dermatology clinic.\"     We have found that certain health care needs can be provided without the need for an in-person physical exam.   If a prescription is necessary we can send it directly to your pharmacy.  If lab work is needed we can place an order for that and you can then stop by our lab to have the test done at a later time.Visits are billed at different rates depending on your insurance coverage. Please reach out to your insurance provider with any questions.    The patient chose to:                                                                                                                                                                                                                 Consent to a teledermatology visit with [employer] photos. Patient told by nursing these will be uploaded prior to visit. Instructions sent to patient via Zouxiu. They verified they will be in the state of [place] at the time of the encounter.                                                                                                                                                                                                                                     The patient denied skin pain, fever, mucosal symptoms(lesions, blisters, sores in the mouth, nose, eyes, or genitals)  IF PATIENT ENDORSES ANY OF THESE STOP AND PAGE ON CALL ATTENDING      Purvi Montaño LPN                                                                                                                                                                                     "

## 2020-08-06 NOTE — PATIENT INSTRUCTIONS
[facility] Dermatology Visit    Thank you for allowing us to participate in your care. Your findings, instructions and follow-up plan are as follows:      Ok to treat scar as normal skin once sutures are gone.    It's ok to apply hydrogen peroxide to the sutures to speed up dissolving process.      When should I call my doctor?    If you are worsening or not improving, please, contact us or seek urgent care as noted below.     Who should I call with questions (adults)?    [facility] (adult and pediatric): [phone]     [facility] (adult): [phone]    For urgent needs outside of business hours call the [facility] [[email or web address]] at [phone] and ask for the dermatology resident on call    If this is a medical emergency and you are unable to reach an ER, Call [phone]      Who should I call with questions (pediatric)?  [facility]- Pediatric Dermatology  Dr. Magy Carney, Dr. Bam Elam, Dr. Maame Kaiser, Kanchan Juárez, PA  Dr. Kym Schroeder, Dr. Nayla Masterson & Dr. Davy Lawrence  Non Urgent  Nurse Triage Line; [phone]- Lala and Shruthi MENEZES Care Coordinators   Kaelyn (/Complex ) [phone]    If you need a prescription refill, please contact your pharmacy. Refills are approved or denied by our Physicians during normal business hours, Monday through Fridays  Per office policy, refills will not be granted if you have not been seen within the past year (or sooner depending on your child's condition)    Scheduling Information:  Pediatric Appointment Scheduling and Call Center [phone]  Radiology Scheduling- [phone]  Sedation Unit Scheduling- [phone]  [place] Scheduling- General [phone]; Pediatric Dermatology [phone]  Main  Services: [phone]  Italian: [phone]  Armenian: [phone]  Hmong/Bradley/Serbian: [phone]  Preadmission  Nursing Department Fax Number: [phone] (Fax all pre-operative paperwork to this number)    For urgent matters arising during evenings, weekends, or holidays that cannot wait for normal business hours please call [phone] and ask for the Dermatology Resident On-Call to be paged.

## 2021-01-15 ENCOUNTER — HEALTH MAINTENANCE LETTER (OUTPATIENT)
Age: 61
End: 2021-01-15

## 2021-01-21 ENCOUNTER — OFFICE VISIT (OUTPATIENT)
Dept: DERMATOLOGY | Facility: CLINIC | Age: 61
End: 2021-01-21
Payer: COMMERCIAL

## 2021-01-21 DIAGNOSIS — Z85.828 HISTORY OF NONMELANOMA SKIN CANCER: Primary | ICD-10-CM

## 2021-01-21 DIAGNOSIS — L82.1 SEBORRHEIC KERATOSES: ICD-10-CM

## 2021-01-21 DIAGNOSIS — L81.4 SOLAR LENTIGO: ICD-10-CM

## 2021-01-21 DIAGNOSIS — D22.9 MULTIPLE BENIGN NEVI: ICD-10-CM

## 2021-01-21 DIAGNOSIS — D18.01 CHERRY ANGIOMA: ICD-10-CM

## 2021-01-21 DIAGNOSIS — L57.8 SUN-DAMAGED SKIN: ICD-10-CM

## 2021-01-21 PROCEDURE — 99213 OFFICE O/P EST LOW 20 MIN: CPT | Performed by: DERMATOLOGY

## 2021-01-21 ASSESSMENT — PAIN SCALES - GENERAL: PAINLEVEL: NO PAIN (0)

## 2021-01-21 NOTE — PROGRESS NOTES
[facility] Dermatology Note  Encounter Date: Jan 21, 2021  Office Visit     Dermatology Problem List:  1. History of NMSC: Recommended nicotinamide 500mg BID for chemoprevention, patient still considering.   - BCC, left posterior neck, excision 7/23/20  - nBCC, left upper back, s/p excision 8/29/19  - nodular and superficial BCC, central upper back, s/p excision 8/29/19  - sBCC, left mid back, s/p ED&C 8/29/19  - BCC, right lateral neck, s/p excision 5/30/19  - BCC, left upper back lateral, s/p excision 5/30/19  - BCC, left upper back medial, s/p excision 5/30/19  - BCC, left lower back, s/p excision 5/30/19  2. AK  -s/p cryotherapy    Family History: No family history of skin cancer.  Social History:  Patient works in [employer]. Spent a lot of time outdoor in his younger years playing [employer].    ____________________________________________    ASSESSMENT/PLAN:  # History of nonmelanoma skin cancer, no clincial evidence of recurrence.  - Sun protection: Counseled SPF30+ sunscreen, UPF clothing, sun avoidance, tanning bed avoidance.   - Recommended nicotinamide 500mg BID for chemoprevention. Patient still contemplating this.    # Sun damaged skin with solar lentigines. Chronic, stable.   - Recommended sunscreens SPF #30 or greater, protective clothing and avoidance of tanning beds.     # Benign findings include: seborrheic keratoses, cherry angioma. Self limited, minor problems.  - No further intervention required. Patient to report changes.  - Patient reassured of the benign nature of these lesions.    # Multiple clinically benign nevi. Chronic, stable.   - No further intervention required. Patient to report changes.  - Patient reassured of the benign nature of these lesions.  - ABCDEs: Counseled ABCDEs of melanoma: Asymmetry, Border (irregularity), Color (not uniform, changes in color), Diameter (greater than 6 mm which is about the size of a pencil eraser), and Evolving (any changes in  preexisting moles).       Procedures Performed:   None    Follow-up: 6 month(s) in-person, or earlier for new or changing lesions    Staff and Scribe:     Scribe Disclosure:   I, Gonzalez Mae, am serving as a scribe to document services personally performed by this physician, Dr. Zayra Santos, based on data collection and the provider's statements to me.     Provider Disclosure:   The documentation recorded by the scribe accurately reflects the services I personally performed and the decisions made by me.    Zayra Santos MD    Department of Dermatology  [facility]: Phone: [phone], Fax:[phone]  [facility]: Phone: [phone], Fax: [phone]    ____________________________________________    CC: Skin Check (6 month skin exam, hx NMSC, areas of concern: 2 spots on left knee)      HPI:  Mr. Audie Harris is a(n) 60 year old male who presents today as a return patient for skin check.    The patient was last seen 8/6/20 for a virtual visit with Dr. Carr to recheck most recent BCC surgery site at the posterior neck.     Today, the patient reports concerns about two spots on his left knee. These seem to be newer. No symptoms. He has not started nicotinamide since his last visit.  Otherwise denies any tender, nonhealing, or bleeding skin lesions.    Patient is otherwise feeling well, without additional concerns.    Labs:  NA    Physical exam:  Vitals: There were no vitals taken for this visit.  SKIN: Total skin excluding the undergarment areas was performed. The exam included the head/face, neck, both arms, chest, back, abdomen, both legs, digits, buttock, and/or nails.   - Escamilla Type II  - Scattered brown macules on sun exposed areas.  - Well healed linear scars in areas of previous BCCs. No pearly appearance or telangiectasias in any.  - Multiple regular brown  pigmented macules and papules are identified on the body, several with congenital appearance on the back. None with concerning atypia.  - There are waxy stuck on tan to brown papules on the trunk and left extensor knee x 2 (patient concern).  - There are dome shaped bright red papules on the trunk and extremities.   - No other lesions of concern on areas examined.     Medications:  Current Outpatient Medications   Medication     fluticasone (FLONASE) 50 MCG/ACT nasal spray     No current facility-administered medications for this visit.       Past Medical History:   Patient Active Problem List   Diagnosis     Benign prostatic hyperplasia with urinary obstruction     Central retinal vein occlusion of right eye     Chronic back pain     Erectile dysfunction     H/O visual field defect     Hyperlipidemia     Franco's syndrome (H)     Past Medical History:   Diagnosis Date     Benign prostate hyperplasia      Central retinal vein occlusion      Deviated septum     Septoplasty     Hyperlipidemia      Franco's disease (H)         CC No referring provider defined for this encounter. on close of this encounter.

## 2021-01-21 NOTE — NURSING NOTE
Audie Harris's goals for this visit include:   Chief Complaint   Patient presents with     Skin Check     6 month skin exam, hx NMSC, areas of concern: 2 spots on left knee     He requests these members of his care team be copied on today's visit information: No    PCP: No Ref-Primary, Physician    Referring Provider:  No referring provider defined for this encounter.    There were no vitals taken for this visit.    Do you need any medication refills at today's visit? No    Jacklyn Spaulding LPN

## 2021-01-21 NOTE — LETTER
1/21/2021         RE: Audie Harris  [address] [address] [address]        Dear Colleague,    Thank you for referring your patient, Audie Harris, to the [facility]. Please see a copy of my visit note below.    [facility] Dermatology Note  Encounter Date: Jan 21, 2021  Office Visit     Dermatology Problem List:  1. History of NMSC: Recommended nicotinamide 500mg BID for chemoprevention, patient still considering.   - BCC, left posterior neck, excision 7/23/20  - nBCC, left upper back, s/p excision 8/29/19  - nodular and superficial BCC, central upper back, s/p excision 8/29/19  - sBCC, left mid back, s/p ED&C 8/29/19  - BCC, right lateral neck, s/p excision 5/30/19  - BCC, left upper back lateral, s/p excision 5/30/19  - BCC, left upper back medial, s/p excision 5/30/19  - BCC, left lower back, s/p excision 5/30/19  2. AK  -s/p cryotherapy    Family History: No family history of skin cancer.  Social History:  Patient works in [employer]. Spent a lot of time outdoor in his younger years playing [employer].    ____________________________________________    ASSESSMENT/PLAN:  # History of nonmelanoma skin cancer, no clincial evidence of recurrence.  - Sun protection: Counseled SPF30+ sunscreen, UPF clothing, sun avoidance, tanning bed avoidance.   - Recommended nicotinamide 500mg BID for chemoprevention. Patient still contemplating this.    # Sun damaged skin with solar lentigines. Chronic, stable.   - Recommended sunscreens SPF #30 or greater, protective clothing and avoidance of tanning beds.     # Benign findings include: seborrheic keratoses, cherry angioma. Self limited, minor problems.  - No further intervention required. Patient to report changes.  - Patient reassured of the benign nature of these lesions.    # Multiple clinically benign nevi. Chronic, stable.   - No further intervention required. Patient to report changes.  - Patient  reassured of the benign nature of these lesions.  - ABCDEs: Counseled ABCDEs of melanoma: Asymmetry, Border (irregularity), Color (not uniform, changes in color), Diameter (greater than 6 mm which is about the size of a pencil eraser), and Evolving (any changes in preexisting moles).       Procedures Performed:   None    Follow-up: 6 month(s) in-person, or earlier for new or changing lesions    Staff and Scribe:     Scribe Disclosure:   I, Gonzalez Mae, am serving as a scribe to document services personally performed by this physician, Dr. Zayra Santos, based on data collection and the provider's statements to me.     Provider Disclosure:   The documentation recorded by the scribe accurately reflects the services I personally performed and the decisions made by me.    Zayra Santos MD    Department of Dermatology  [facility]: Phone: [phone], Fax:[phone]  [facility]: Phone: [phone], Fax: [phone]    ____________________________________________    CC: Skin Check (6 month skin exam, hx NMSC, areas of concern: 2 spots on left knee)      HPI:  Mr. Audie Harris is a(n) 60 year old male who presents today as a return patient for skin check.    The patient was last seen 8/6/20 for a virtual visit with Dr. Carr to recheck most recent BCC surgery site at the posterior neck.     Today, the patient reports concerns about two spots on his left knee. These seem to be newer. No symptoms. He has not started nicotinamide since his last visit.  Otherwise denies any tender, nonhealing, or bleeding skin lesions.    Patient is otherwise feeling well, without additional concerns.    Labs:  NA    Physical exam:  Vitals: There were no vitals taken for this visit.  SKIN: Total skin excluding the undergarment areas was performed. The exam included the head/face, neck, both arms,  chest, back, abdomen, both legs, digits, buttock, and/or nails.   - Escamilla Type II  - Scattered brown macules on sun exposed areas.  - Well healed linear scars in areas of previous BCCs. No pearly appearance or telangiectasias in any.  - Multiple regular brown pigmented macules and papules are identified on the body, several with congenital appearance on the back. None with concerning atypia.  - There are waxy stuck on tan to brown papules on the trunk and left extensor knee x 2 (patient concern).  - There are dome shaped bright red papules on the trunk and extremities.   - No other lesions of concern on areas examined.     Medications:  Current Outpatient Medications   Medication     fluticasone (FLONASE) 50 MCG/ACT nasal spray     No current facility-administered medications for this visit.       Past Medical History:   Patient Active Problem List   Diagnosis     Benign prostatic hyperplasia with urinary obstruction     Central retinal vein occlusion of right eye     Chronic back pain     Erectile dysfunction     H/O visual field defect     Hyperlipidemia     Franco's syndrome (H)     Past Medical History:   Diagnosis Date     Benign prostate hyperplasia      Central retinal vein occlusion      Deviated septum     Septoplasty     Hyperlipidemia      Franco's disease (H)         CC No referring provider defined for this encounter. on close of this encounter.      Again, thank you for allowing me to participate in the care of your patient.        Sincerely,        Zayra Santos MD

## 2021-07-20 ENCOUNTER — OFFICE VISIT (OUTPATIENT)
Dept: DERMATOLOGY | Facility: CLINIC | Age: 61
End: 2021-07-20
Payer: COMMERCIAL

## 2021-07-20 DIAGNOSIS — L81.4 SOLAR LENTIGO: ICD-10-CM

## 2021-07-20 DIAGNOSIS — L57.8 SUN-DAMAGED SKIN: ICD-10-CM

## 2021-07-20 DIAGNOSIS — D18.01 CHERRY ANGIOMA: ICD-10-CM

## 2021-07-20 DIAGNOSIS — D22.9 MULTIPLE BENIGN NEVI: ICD-10-CM

## 2021-07-20 DIAGNOSIS — L82.1 SEBORRHEIC KERATOSES: ICD-10-CM

## 2021-07-20 DIAGNOSIS — Z85.828 HISTORY OF NONMELANOMA SKIN CANCER: Primary | ICD-10-CM

## 2021-07-20 PROCEDURE — 99213 OFFICE O/P EST LOW 20 MIN: CPT | Performed by: DERMATOLOGY

## 2021-07-20 ASSESSMENT — PAIN SCALES - GENERAL: PAINLEVEL: NO PAIN (0)

## 2021-07-20 NOTE — LETTER
7/20/2021         RE: Audie Harris  [address] [address] [address]        Dear Colleague,    Thank you for referring your patient, Audie Harris, to the [facility]. Please see a copy of my visit note below.    [facility] Dermatology Note  Encounter Date: Jul 20, 2021  Office Visit     Dermatology Problem List:  1. History of NMSC: Recommended nicotinamide 500mg BID for chemoprevention, patient still considering.   - BCC, left posterior neck, excision 7/23/20  - nBCC, left upper back, s/p excision 8/29/19  - nodular and superficial BCC, central upper back, s/p excision 8/29/19  - sBCC, left mid back, s/p ED&C 8/29/19  - BCC, right lateral neck, s/p excision 5/30/19  - BCC, left upper back lateral, s/p excision 5/30/19  - BCC, left upper back medial, s/p excision 5/30/19  - BCC, left lower back, s/p excision 5/30/19  2. AK  -s/p cryotherapy     Family History: No family history of skin cancer.  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 Spent a lot of time outdoor in his younger years playing [employer].     ____________________________________________     ASSESSMENT/PLAN:    # History of nonmelanoma skin cancer, no clincial evidence of recurrence.  - Sun protection: Counseled SPF30+ sunscreen, UPF clothing, sun avoidance, tanning bed avoidance.   - *Recommended nicotinamide 500mg BID for chemoprevention. Patient still contemplating this.     # Sun damaged skin with solar lentigines. Chronic, stable.   - Recommended sunscreens SPF #30 or greater, protective clothing and avoidance of tanning beds.      # Benign findings include: seborrheic keratoses, cherry angioma. Self limited, minor problems.  - No further intervention required. Patient to report changes.  - Patient reassured of the benign nature of these lesions.     # Multiple clinically benign nevi. Chronic, stable.   - No further intervention required.  Patient to report changes.  - Patient reassured of the benign nature of these lesions.  - ABCDEs: Counseled ABCDEs of melanoma: Asymmetry, Border (irregularity), Color (not uniform, changes in color), Diameter (greater than 6 mm which is about the size of a pencil eraser), and Evolving (any changes in preexisting moles).      Procedures Performed:   None.    Follow-up: 1 year(s) in-person, or earlier for new or changing lesions    Staff and Scribe:     Scribe Disclosure:   I, Gonzalez Mae, am serving as a scribe to document services personally performed by this physician, Dr. Zayra Santos, based on data collection and the provider's statements to me.     Provider Disclosure:   The documentation recorded by the scribe accurately reflects the services I personally performed and the decisions made by me.    Zayra Santos MD    Department of Dermatology  [facility]: Phone: [phone], Fax:[phone]  [facility]: Phone: [phone], Fax: [phone]    ____________________________________________    CC: Derm Problem (Audie is here today for skin exam, has concerns on forearm and right lower butt cheek )    HPI:  Mr. Audie Harris is a(n) 61 year old male who presents today as a return patient for a skin check.    Last seen 01/21/21 for a skin check. At that time, no concerning lesions were noted.    Today, he has an area of concern on the right forearm (new spot, no symptoms) and right lower butt cheek (feels like ingrown hair, but has been present for months).    Patient is otherwise feeling well, without additional concerns.    Labs:  NA    Physical exam:  Vitals: There were no vitals taken for this visit.  SKIN: Total skin excluding the undergarment areas was performed. The exam included the head/face, neck, both arms, chest, back, abdomen, both legs, buttocks, digits  and/or nails.   - Escamilla Type II  - Scattered brown macules on sun exposed areas.  - Well healed linear scars in areas of previous BCCs. No pearly appearance or telangiectasias in any.  - Multiple regular brown pigmented macules and papules are identified on the body, several with congenital appearance on the back. None with concerning atypia.  - There are waxy stuck on tan to brown papules on the right forearm (spot of concern) and trunk.  - There are dome shaped bright red papules on the trunk and extremities.  - No lesion appreciated on R buttock exam.  - No other lesions of concern on areas examined.     Medications:  Current Outpatient Medications   Medication     fluticasone (FLONASE) 50 MCG/ACT nasal spray     No current facility-administered medications for this visit.      Past Medical History:   Patient Active Problem List   Diagnosis     Benign prostatic hyperplasia with urinary obstruction     Central retinal vein occlusion of right eye     Chronic back pain     Erectile dysfunction     H/O visual field defect     Hyperlipidemia     Franco's syndrome (H)     Past Medical History:   Diagnosis Date     Benign prostate hyperplasia      Central retinal vein occlusion      Deviated septum     Septoplasty     Hyperlipidemia      Franco's disease (H)         CC No referring provider defined for this encounter. on close of this encounter.      Again, thank you for allowing me to participate in the care of your patient.        Sincerely,        Zayra Santos MD

## 2021-07-20 NOTE — NURSING NOTE
Audie Harris's goals for this visit include:   Chief Complaint   Patient presents with     Derm Problem     Audie is here today for skin exam, has concerns on forearm and right lower butt cheek        He requests these members of his care team be copied on today's visit information:     PCP: No Ref-Primary, Physician    Referring Provider:  No referring provider defined for this encounter.    There were no vitals taken for this visit.    Do you need any medication refills at today's visit? No    Valeria Garber LPN

## 2021-07-20 NOTE — PROGRESS NOTES
[facility] Dermatology Note  Encounter Date: Jul 20, 2021  Office Visit     Dermatology Problem List:  1. History of NMSC: Recommended nicotinamide 500mg BID for chemoprevention, patient still considering.   - BCC, left posterior neck, excision 7/23/20  - nBCC, left upper back, s/p excision 8/29/19  - nodular and superficial BCC, central upper back, s/p excision 8/29/19  - sBCC, left mid back, s/p ED&C 8/29/19  - BCC, right lateral neck, s/p excision 5/30/19  - BCC, left upper back lateral, s/p excision 5/30/19  - BCC, left upper back medial, s/p excision 5/30/19  - BCC, left lower back, s/p excision 5/30/19  2. AK  -s/p cryotherapy     Family History: No family history of skin cancer.  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     ____________________________________________     ASSESSMENT/PLAN:    # History of nonmelanoma skin cancer, no clincial evidence of recurrence.  - Sun protection: Counseled SPF30+ sunscreen, UPF clothing, sun avoidance, tanning bed avoidance.   - *Recommended nicotinamide 500mg BID for chemoprevention. Patient still contemplating this.     # Sun damaged skin with solar lentigines. Chronic, stable.   - Recommended sunscreens SPF #30 or greater, protective clothing and avoidance of tanning beds.      # Benign findings include: seborrheic keratoses, cherry angioma. Self limited, minor problems.  - No further intervention required. Patient to report changes.  - Patient reassured of the benign nature of these lesions.     # Multiple clinically benign nevi. Chronic, stable.   - No further intervention required. Patient to report changes.  - Patient reassured of the benign nature of these lesions.  - ABCDEs: Counseled ABCDEs of melanoma: Asymmetry, Border (irregularity), Color (not uniform, changes in color), Diameter (greater than 6 mm which is about the size of a pencil  eraser), and Evolving (any changes in preexisting moles).      Procedures Performed:   None.    Follow-up: 1 year(s) in-person, or earlier for new or changing lesions    Staff and Scribe:     Scribe Disclosure:   I, Gonzalez Mae, am serving as a scribe to document services personally performed by this physician, Dr. Zayra Santos, based on data collection and the provider's statements to me.     Provider Disclosure:   The documentation recorded by the scribe accurately reflects the services I personally performed and the decisions made by me.    Zayra Santos MD    Department of Dermatology  [facility]: Phone: [phone], Fax:[phone]  [facility]: Phone: [phone], Fax: [phone]    ____________________________________________    CC: Derm Problem (Audie is here today for skin exam, has concerns on forearm and right lower butt cheek )    HPI:  Mr. Audie Harris is a(n) 61 year old male who presents today as a return patient for a skin check.    Last seen 01/21/21 for a skin check. At that time, no concerning lesions were noted.    Today, he has an area of concern on the right forearm (new spot, no symptoms) and right lower butt cheek (feels like ingrown hair, but has been present for months).    Patient is otherwise feeling well, without additional concerns.    Labs:  NA    Physical exam:  Vitals: There were no vitals taken for this visit.  SKIN: Total skin excluding the undergarment areas was performed. The exam included the head/face, neck, both arms, chest, back, abdomen, both legs, buttocks, digits and/or nails.   - Escamilla Type II  - Scattered brown macules on sun exposed areas.  - Well healed linear scars in areas of previous BCCs. No pearly appearance or telangiectasias in any.  - Multiple regular brown pigmented macules and papules are identified on  the body, several with congenital appearance on the back. None with concerning atypia.  - There are waxy stuck on tan to brown papules on the right forearm (spot of concern) and trunk.  - There are dome shaped bright red papules on the trunk and extremities.  - No lesion appreciated on R buttock exam.  - No other lesions of concern on areas examined.     Medications:  Current Outpatient Medications   Medication     fluticasone (FLONASE) 50 MCG/ACT nasal spray     No current facility-administered medications for this visit.      Past Medical History:   Patient Active Problem List   Diagnosis     Benign prostatic hyperplasia with urinary obstruction     Central retinal vein occlusion of right eye     Chronic back pain     Erectile dysfunction     H/O visual field defect     Hyperlipidemia     Franco's syndrome (H)     Past Medical History:   Diagnosis Date     Benign prostate hyperplasia      Central retinal vein occlusion      Deviated septum     Septoplasty     Hyperlipidemia      Franco's disease (H)         CC No referring provider defined for this encounter. on close of this encounter.

## 2021-09-04 ENCOUNTER — HEALTH MAINTENANCE LETTER (OUTPATIENT)
Age: 61
End: 2021-09-04

## 2022-02-19 ENCOUNTER — HEALTH MAINTENANCE LETTER (OUTPATIENT)
Age: 62
End: 2022-02-19

## 2022-10-22 ENCOUNTER — HEALTH MAINTENANCE LETTER (OUTPATIENT)
Age: 62
End: 2022-10-22

## 2023-04-01 ENCOUNTER — HEALTH MAINTENANCE LETTER (OUTPATIENT)
Age: 63
End: 2023-04-01

## 2024-06-02 ENCOUNTER — HEALTH MAINTENANCE LETTER (OUTPATIENT)
Age: 64
End: 2024-06-02